# Patient Record
Sex: MALE | Race: WHITE
[De-identification: names, ages, dates, MRNs, and addresses within clinical notes are randomized per-mention and may not be internally consistent; named-entity substitution may affect disease eponyms.]

---

## 2017-11-14 ENCOUNTER — HOSPITAL ENCOUNTER (INPATIENT)
Dept: HOSPITAL 45 - C.EDC | Age: 67
LOS: 2 days | Discharge: HOME | DRG: 988 | End: 2017-11-16
Attending: HOSPITALIST | Admitting: HOSPITALIST
Payer: COMMERCIAL

## 2017-11-14 VITALS
BODY MASS INDEX: 30.49 KG/M2 | WEIGHT: 212.97 LBS | BODY MASS INDEX: 30.49 KG/M2 | HEIGHT: 70 IN | HEIGHT: 70 IN | WEIGHT: 212.97 LBS

## 2017-11-14 VITALS — OXYGEN SATURATION: 94 %

## 2017-11-14 DIAGNOSIS — C85.14: ICD-10-CM

## 2017-11-14 DIAGNOSIS — Z86.59: ICD-10-CM

## 2017-11-14 DIAGNOSIS — Z82.49: ICD-10-CM
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
## 2017-11-14 DIAGNOSIS — S22.43XA: Primary | ICD-10-CM
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
## 2017-11-14 LAB
ALP SERPL-CCNC: 87 U/L (ref 45–117)
ALT SERPL-CCNC: 23 U/L (ref 12–78)
ANION GAP SERPL CALC-SCNC: 7 MMOL/L (ref 3–11)
AST SERPL-CCNC: 16 U/L (ref 15–37)
BASOPHILS # BLD: 0.03 K/UL (ref 0–0.2)
BASOPHILS NFR BLD: 0.4 %
BUN SERPL-MCNC: 25 MG/DL (ref 7–18)
BUN/CREAT SERPL: 27.5 (ref 10–20)
CALCIUM SERPL-MCNC: 8.9 MG/DL (ref 8.5–10.1)
CHLORIDE SERPL-SCNC: 106 MMOL/L (ref 98–107)
CO2 SERPL-SCNC: 26 MMOL/L (ref 21–32)
COMPLETE: YES
CREAT CL PREDICTED SERPL C-G-VRATE: 94.3 ML/MIN
CREAT SERPL-MCNC: 0.91 MG/DL (ref 0.6–1.4)
EOSINOPHIL NFR BLD AUTO: 171 K/UL (ref 130–400)
GLUCOSE SERPL-MCNC: 94 MG/DL (ref 70–99)
HCT VFR BLD CALC: 41.9 % (ref 42–52)
IG%: 0.4 %
IMM GRANULOCYTES NFR BLD AUTO: 8.6 %
INR PPP: 1 (ref 0.9–1.1)
LYMPHOCYTES # BLD: 0.61 K/UL (ref 1.2–3.4)
MCH RBC QN AUTO: 30.6 PG (ref 25–34)
MCHC RBC AUTO-ENTMCNC: 34.8 G/DL (ref 32–36)
MCV RBC AUTO: 87.8 FL (ref 80–100)
MONOCYTES NFR BLD: 7 %
NEUTROPHILS # BLD AUTO: 0.6 %
NEUTROPHILS NFR BLD AUTO: 83 %
PARTIAL THROMBOPLASTIN RATIO: 1
PMV BLD AUTO: 8.8 FL (ref 7.4–10.4)
POTASSIUM SERPL-SCNC: 3.8 MMOL/L (ref 3.5–5.1)
PROTHROMBIN TIME: 11.1 SECONDS (ref 9–12)
RBC # BLD AUTO: 4.77 M/UL (ref 4.7–6.1)
SODIUM SERPL-SCNC: 139 MMOL/L (ref 136–145)
WBC # BLD AUTO: 7.13 K/UL (ref 4.8–10.8)

## 2017-11-14 RX ADMIN — Medication SCH EA: at 22:01

## 2017-11-14 RX ADMIN — ENOXAPARIN SODIUM SCH MG: 40 INJECTION SUBCUTANEOUS at 23:30

## 2017-11-14 RX ADMIN — TRAMADOL HYDROCHLORIDE PRN MG: 50 TABLET, COATED ORAL at 22:19

## 2017-11-14 NOTE — HISTORY & PHYSICAL EXAMINATION
DATE OF ADMISSION:  11/14/2017

 

The patient has no primary care doctor.

 

History was obtained from the patient and records.

 

CHIEF COMPLAINT:  Left back pain.

 

HISTORY OF PRESENT ILLNESS:  



Medical history is significant for mood disorder, currently not on medications.
  



Patient is a [place] resident.  

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  



Four days ago, the patient was pulled by his dog at the park, leading

him to fall on his left back.

Px noted worsening L posterior pleuritic back pain reminiscent of rib fracture

pain from about 7 years ago.  No shortness of breath, no hemoptysis, no

unusual weight loss.  No abdominal pain.  No hematuria.  



Intractable pain in the Emergency Room.

 

MEDICAL HISTORY:  As above.

 

SURGERIES:  Hernia surgery.

 

HOME MEDICATIONS:  Ibuprofen, as needed. 

 

ALLERGIES:  No known drug allergies.

 

FAMILY HISTORY:  Heart disease.

 

PERSONAL AND SOCIAL HISTORY:  Nonsmoker.  No chronic intake of alcoholic

beverages.  He was in the hotel management field prior to correction.

 

REVIEW OF SYSTEMS:  As per HPI, all 10 systems reviewed, all other ROS negative.

 

PHYSICAL EXAMINATION:

VITAL SIGNS:  Blood pressure was noted to be 131/90, pulse rate 69, RR 19,

temperature 36.7 and sats 94 on room air.

GENERAL:  Noted to be pleasant, in no respiratory distress.

SKIN:  Normal color.  Warm.

HEENT:  Pink palpebral conjunctivae.  No ptosis.  Dry buccal mucosa.

NECK:  Supple.  No tenderness.

CHEST:  Clear to auscultation.   posterior left chest wall tenderness.

HEART:  Regular rate and rhythm.  No murmur.

ABDOMEN:  Soft and nontender.

EXTREMITIES:  No edema.  No gross deformities.  No tenderness

NEUROLOGIC:  Coherent.  No gross focality.

 

LABORATORIES:  Hemoglobin was noted to be 14.6, hematocrit 41.9, white cell

count 10 and platelets of 171.  Sodium 142, potassium 3.8  chloride

106, CO2 26, BUN 25, creatinine 0.9 and glucose 194.  LFTs normal. 



CT of chest showed bilateral acute rib fractures, atelectasis and extensive

lymphadenopathy involving the mediastinal and hilar with additional sites of 
involvement 

including below the diaphragm highly concerning for metastatic disease or 
lymphoma,

Radiolucent lesion, left humerus

humeral head.

 

ASSESSMENT:

1.  Intractable pain from L posterior rib fracture secondary to fall.

2.  Incidental finding of a generalized lymphadenopathy

Possible lymphoma  

3.  History mood disorder, stable off meds

As per patient he was on meds years ago during a divorce.

  

PLAN:  

GMF

analgesia. 

PT OT eval

outpatient Hematology-Oncology referral for lymphadenopathy

DVT prophylaxis, Lovenox subQ.  

Full code.

 

 

 

MTDD

## 2017-11-14 NOTE — EMERGENCY ROOM VISIT NOTE
History


Report prepared by Say:  Mandi Paez


Under the Supervision of:  Dr. Don Sanchez M.D.


First contact with patient:  17:16


Stated Complaint:  BACK & RIB PAIN





History of Present Illness


The patient is a 67 year old male who presents to the Emergency Room with 

complaints of worsening left sided back and rib pain beginning 4 days ago. The 

patient notes that 4 days ago he was pulled over by his dog and fell on his 

back. Seven years ago, the patient was diagnosed with cracked ribs and states 

his pain is identical to his pain then. The patient's pain worsens with 

walking. This morning, the patient coughed and his back pain worsened. He has 

been taking Ibuprofen over the past four days and last took it 9 hours ago. The 

patient laid down this afternoon and has not been able to sit up since. The 

patient denies any abdominal pain and is not on any blood thinners.





   Source of History:  patient


   Onset:  4 days ago


   Position:  other (rib)


   Timing:  worsening


   Modifying Factors (Worsening):  movement


   Associated Symptoms:  + back pain, No abdominal pain





Review of Systems


See HPI for pertinent positives & negatives. A total of 10 systems reviewed and 

were otherwise negative.





Past Medical & Surgical


Medical Problems:


(1) No Known Active Medical Problems








Family History


No pertinent family history stated.





Social History


Alcohol Use:  occasionally


Marital Status:  





Current/Historical Medications


Scheduled PRN


Ibuprofen (Motrin), 400 MG PO TID PRN for Headache or Pain





Allergies


Coded Allergies:  


     No Known Allergies (Unverified , 11/14/17)





Physical Exam


Vital Signs











  Date Time  Temp Pulse Resp B/P (MAP) Pulse Ox O2 Delivery O2 Flow Rate FiO2


 


11/14/17 19:42    139/75    


 


11/14/17 18:43  69 19  90   


 


11/14/17 18:42    131/90    


 


11/14/17 18:30    142/79    


 


11/14/17 18:28  77 20  93   


 


11/14/17 18:13  75 23     


 


11/14/17 18:09  70      


 


11/14/17 18:00    134/79    


 


11/14/17 17:46     94 Room Air  


 


11/14/17 17:46     94 Room Air  


 


11/14/17 17:20 36.7 84 20 157/93 94 Room Air  











Physical Exam


GENERAL: Patient is a healthy-appearing well-nourished male


HEAD: Normocephalic atraumatic


EYES: Ocular movements intact pupils equal and react to light


OROPHARYNX mucous membranes are moist no exudates present no erythema or edema 

present


NECK: Supple no nuchal rigidity


CHEST: Exquisitely tender to left 10 and 11 rib area. The patient is unable to 

rollover.


LUNGS: Clear and equal to auscultation


CARDIAC: Normal S1 and S2


ABDOMEN: Soft nontender no guarding


BACK: No CVA tenderness


EXTREMITIES: No pain upon palpation normal muscle strength in all groups no 

clubbing cyanosis or edema


NEURO: Patient is following commands and answering questions appropriately. 

Alert and oriented x3 Cranial Nerves 2-12 grossly intact





Medical Decision & Procedures


ER Provider


Diagnostic Interpretation:


Radiology results as stated below per my review and radiologist interpretation:





(CHEST) THORAX WITH





FINDINGS:





 topogram: Unremarkable.





On soft tissue windows, normal thyroid and thoracic inlet. Asymmetric prominence


of left axillary lymph nodes, which measure up to 8 mm in the short axis.


Enlarged mediastinal lymph nodes with conglomerate lymphadenopathy in the


pretracheal and subcarinal regions. Conglomerate bilateral hilar


lymphadenopathy. Prominent right cardiophrenic lymph node. Atherosclerosis of


the aorta. Minimal aortic valve and coronary artery calcification. Normal heart


size. Trace left pleural effusion. No pericardial effusion. Congenital


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 2 well-defined


hypodensities in the liver indeterminate but likely hepatic cysts. Multiple


enlarged mesenteric lymph nodes noted. Borderline enlarged retroperitoneal


lymphadenopathy.





On lung windows, bilateral extensive bandlike opacities at the lung bases with


associated lower lobe volume loss, likely atelectasis. No other focal


infiltrate. Airways patent.





On bone windows, degenerative changes of the spine. Lucent lesion in the left


humeral head. Suggestion of nondisplaced anterior right rib fractures involving


the right third through sixth ribs. Suggestion of multiple old healed posterior


right rib fractures. Acute fracture of the left posterior eighth through 10th


ribs.





IMPRESSION:


1.  Bilateral acute rib fractures.





2.  Extensive bibasilar opacities with volume loss likely atelectasis.





3.  Extensive lymphadenopathy prominently involving the mediastinum and shauna


with additional sites of involvement as above, including below the diaphragm.


This is highly concerning for metastatic disease or lymphoma.





4.  Indeterminate lucent lesion in the left humeral head. Metastatic lesion not


excluded.











Electronically signed by:  Rober Campbell M.D.





THORACIC SPINE WITHOUT





FINDINGS:





 topogram: Unremarkable.





Normal thoracic kyphosis. Vertebral bodies maintain normal height and alignment.


Intervertebral disc spaces preserved. No acute fracture or subluxation. Acute


rib fractures better appreciated on dedicated chest CT. Multilevel degenerative


changes. Osseous neural foraminal narrowing suggested in the mid to lower


thoracic spine greater on the left. Paraspinal soft tissues remarkable for


partially visualized extensive lymphadenopathy better appreciated on CT chest.





IMPRESSION:


1.  No acute osseous injury of the thoracic spine.





2.  Please see separately dictated CT of the chest for description of acute


bilateral rib fractures and extensive lymphadenopathy.











Electronically signed by:  Rober Campbell M.D.





Laboratory Results


11/14/17 17:40








Red Blood Count 4.77, Mean Corpuscular Volume 87.8, Mean Corpuscular Hemoglobin 

30.6, Mean Corpuscular Hemoglobin Concent 34.8, Mean Platelet Volume 8.8, 

Neutrophils (%) (Auto) 83.0, Lymphocytes (%) (Auto) 8.6, Monocytes (%) (Auto) 

7.0, Eosinophils (%) (Auto) 0.6, Basophils (%) (Auto) 0.4, Neutrophils # (Auto) 

5.92, Lymphocytes # (Auto) 0.61, Monocytes # (Auto) 0.50, Eosinophils # (Auto) 

0.04, Basophils # (Auto) 0.03





11/14/17 17:40

















Test


  11/14/17


17:40


 


White Blood Count


  7.13 K/uL


(4.8-10.8)


 


Red Blood Count


  4.77 M/uL


(4.7-6.1)


 


Hemoglobin


  14.6 g/dL


(14.0-18.0)


 


Hematocrit 41.9 % (42-52) 


 


Mean Corpuscular Volume


  87.8 fL


()


 


Mean Corpuscular Hemoglobin


  30.6 pg


(25-34)


 


Mean Corpuscular Hemoglobin


Concent 34.8 g/dl


(32-36)


 


Platelet Count


  171 K/uL


(130-400)


 


Mean Platelet Volume


  8.8 fL


(7.4-10.4)


 


Neutrophils (%) (Auto) 83.0 % 


 


Lymphocytes (%) (Auto) 8.6 % 


 


Monocytes (%) (Auto) 7.0 % 


 


Eosinophils (%) (Auto) 0.6 % 


 


Basophils (%) (Auto) 0.4 % 


 


Neutrophils # (Auto)


  5.92 K/uL


(1.4-6.5)


 


Lymphocytes # (Auto)


  0.61 K/uL


(1.2-3.4)


 


Monocytes # (Auto)


  0.50 K/uL


(0.11-0.59)


 


Eosinophils # (Auto)


  0.04 K/uL


(0-0.5)


 


Basophils # (Auto)


  0.03 K/uL


(0-0.2)


 


RDW Standard Deviation


  42.2 fL


(36.4-46.3)


 


RDW Coefficient of Variation


  13.2 %


(11.5-14.5)


 


Immature Granulocyte % (Auto) 0.4 % 


 


Immature Granulocyte # (Auto)


  0.03 K/uL


(0.00-0.02)


 


Anion Gap


  7.0 mmol/L


(3-11)


 


Est Creatinine Clear Calc


Drug Dose 94.3 ml/min 


 


 


Estimated GFR (


American) 100.7 


 


 


Estimated GFR (Non-


American 86.9 


 


 


BUN/Creatinine Ratio 27.5 (10-20) 


 


Calcium Level


  8.9 mg/dl


(8.5-10.1)


 


Total Bilirubin


  0.5 mg/dl


(0.2-1)


 


Direct Bilirubin


  0.2 mg/dl


(0-0.2)


 


Aspartate Amino Transf


(AST/SGOT) 16 U/L (15-37) 


 


 


Alanine Aminotransferase


(ALT/SGPT) 23 U/L (12-78) 


 


 


Alkaline Phosphatase


  87 U/L


()


 


Total Creatine Kinase


  96 U/L


()


 


Total Protein


  6.8 gm/dl


(6.4-8.2)


 


Albumin


  3.7 gm/dl


(3.4-5.0)





Labs reviewed by ED physician.





Medications Administered











 Medications


  (Trade)  Dose


 Ordered  Sig/Matthias


 Route  Start Time


 Stop Time Status Last Admin


Dose Admin


 


 Hydromorphone HCl


  (Dilaudid Inj)  1 mg  NOW  STAT


 IV  11/14/17 17:24


 11/14/17 17:26 DC 11/14/17 17:24


1 MG


 


 Ketorolac


 Tromethamine


  (Toradol Inj)  30 mg  NOW  STAT


 IV  11/14/17 17:24


 11/14/17 17:26 DC 11/14/17 18:01


30 MG


 


 Lidocaine


  (Lidoderm Patch


 5%)  1 patch  NOW  STAT


 TD  11/14/17 17:24


 11/14/17 17:26 DC 11/14/17 17:24


1 PATCH


 


 Ondansetron HCl


  (Zofran Inj)  4 mg  NOW  STAT


 IV  11/14/17 17:24


 11/14/17 17:26 DC 11/14/17 18:01


4 MG











ED Course


1717: Past medical records reviewed. The patient was evaluated in room C10. A 

complete history and physical examination was performed. 





1724: Zofran Inj 4 mg IV, Lidocaine 1 patch TD, Toradol Inj 30 mg IV, Dilaudid 

Inj 1 mg IV. 





1930: I updated the patient on his test results. 





1937: I discussed the patient's case with Dr. Mercado, he has agreed 

to evaluate the patient for further management and care.





Medical Decision





Differential diagnosis:


Etiologies such as fracture, dislocation, intra-abdominal, pneumothorax, 

intrathoracic , intracranial, neurologic, as well as other traumatic 

pathologies were entertained.





This is a 67-year-old male who presents emergency department complaining of low 

back pain after a fall at home several days ago.  The patient is exquisitely 

tender to the left 11th and 10th rib area.  An IV was established, the patient 

was given Dilaudid.  His laboratory results are essentially normal.  The 

patient was sent for CAT scan of his chest as well as his thoracic spine based 

on where his pain was.  Serial abdominal examinations were performed on the 

patient in the emergency department and no tended to the patient exhibit a 

surgical abdomen or abdominal tenderness.  The patient was sent for a CAT scan 

which was concerning for multiple rib fractures along with a large amount of 

lymphadenopathy.  This patient does not have a primary care physician and based 

on this along with the fact and I'm having difficulty getting his pain under 

control I felt he should be admitted for a cancer workup.  I did discuss the 

case with the hospitalist service who agreed to admit the patient.  Patient was 

in agreement with the treatment plan.





Blood Pressure Screening


Patient's blood pressure:  Elevated blood pressure


Blood pressure disposition:  Referred to PCP (evaluated by hospitalist)





Consults


Time Called:  1930


Consulting Physician:  Dr. Mercado


Returned Call:  1937


I discussed the patient's case with Dr. Mercado, he has agreed to 

evaluate the patient for further management and care.





Impression





 Primary Impression:  


 Fall


 Additional Impressions:  


 Rib fractures


 Lymphadenopathy





Scribe Attestation


The scribe's documentation has been prepared under my direction and personally 

reviewed by me in its entirety. I confirm that the note above accurately 

reflects all work, treatment, procedures, and medical decision making performed 

by me.





Departure Information


Dispostion


Being Evaluated By Hospitalist





Referrals


No Doctor, Assigned (PCP)





Problem Qualifiers








 Primary Impression:  


 Fall


 Encounter type:  initial encounter  Qualified Codes:  W19.XXXA - Unspecified 

fall, initial encounter


 Additional Impressions:  


 Rib fractures


 Encounter type:  initial encounter  Rib fracture type:  multiple ribs  

Fracture type:  closed  Laterality:  left  Qualified Codes:  S22.42XA - 

Multiple fractures of ribs, left side, initial encounter for closed fracture

## 2017-11-14 NOTE — DIAGNOSTIC IMAGING REPORT
(CHEST) THORAX WITH



CLINICAL HISTORY: 67 years-old Male presenting with Pt c/o fall, left sided rib

pain . 



TECHNIQUE: Multidetector CT imaging of the chest was performed after the

administration of intravenous contrast. IV contrast: None. A dose lowering

technique was used consistent with the principles of ALARA (as low as reasonably

achievable).



COMPARISON: None.



CT DOSE (mGy.cm): The estimated cumulative dose is 465.32.



FINDINGS:



 topogram: Unremarkable.



On soft tissue windows, normal thyroid and thoracic inlet. Asymmetric prominence

of left axillary lymph nodes, which measure up to 8 mm in the short axis.

Enlarged mediastinal lymph nodes with conglomerate lymphadenopathy in the

pretracheal and subcarinal regions. Conglomerate bilateral hilar

lymphadenopathy. Prominent right cardiophrenic lymph node. Atherosclerosis of

the aorta. Minimal aortic valve and coronary artery calcification. Normal heart

size. Trace left pleural effusion. No pericardial effusion. Congenital

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 2 well-defined

hypodensities in the liver indeterminate but likely hepatic cysts. Multiple

enlarged mesenteric lymph nodes noted. Borderline enlarged retroperitoneal

lymphadenopathy.



On lung windows, bilateral extensive bandlike opacities at the lung bases with

associated lower lobe volume loss, likely atelectasis. No other focal

infiltrate. Airways patent.



On bone windows, degenerative changes of the spine. Lucent lesion in the left

humeral head. Suggestion of nondisplaced anterior right rib fractures involving

the right third through sixth ribs. Suggestion of multiple old healed posterior

right rib fractures. Acute fracture of the left posterior eighth through 10th

ribs.



IMPRESSION:

1.  Bilateral acute rib fractures.



2.  Extensive bibasilar opacities with volume loss likely atelectasis.



3.  Extensive lymphadenopathy prominently involving the mediastinum and shauna

with additional sites of involvement as above, including below the diaphragm.

This is highly concerning for metastatic disease or lymphoma.



4.  Indeterminate lucent lesion in the left humeral head. Metastatic lesion not

excluded.







Electronically signed by:  Rober Campbell M.D.

11/14/2017 7:22 PM



Dictated Date/Time:  11/14/2017 7:15 PM

## 2017-11-14 NOTE — DIAGNOSTIC IMAGING REPORT
THORACIC SPINE WITHOUT



CLINICAL HISTORY: 67 years-old Male presenting with Pt c/o left sided back pain

. 



TECHNIQUE: Multidetector CT of the thoracic spine was performed without the use

of intravenous contrast. IV contrast: None. A dose lowering technique was used

consistent with the principles of ALARA (as low as reasonably achievable).



COMPARISON: None.



CT DOSE (mGy.cm): The estimated cumulative dose is 465.32 mGy.cm.



FINDINGS:



 topogram: Unremarkable.



Normal thoracic kyphosis. Vertebral bodies maintain normal height and alignment.

Intervertebral disc spaces preserved. No acute fracture or subluxation. Acute

rib fractures better appreciated on dedicated chest CT. Multilevel degenerative

changes. Osseous neural foraminal narrowing suggested in the mid to lower

thoracic spine greater on the left. Paraspinal soft tissues remarkable for

partially visualized extensive lymphadenopathy better appreciated on CT chest.



IMPRESSION:

1.  No acute osseous injury of the thoracic spine.



2.  Please see separately dictated CT of the chest for description of acute

bilateral rib fractures and extensive lymphadenopathy.







Electronically signed by:  Rober Campbell M.D.

11/14/2017 7:24 PM



Dictated Date/Time:  11/14/2017 7:22 PM

## 2017-11-15 VITALS
SYSTOLIC BLOOD PRESSURE: 135 MMHG | OXYGEN SATURATION: 93 % | DIASTOLIC BLOOD PRESSURE: 87 MMHG | TEMPERATURE: 98.06 F | HEART RATE: 76 BPM

## 2017-11-15 VITALS
TEMPERATURE: 98.6 F | DIASTOLIC BLOOD PRESSURE: 82 MMHG | OXYGEN SATURATION: 94 % | HEART RATE: 70 BPM | SYSTOLIC BLOOD PRESSURE: 133 MMHG

## 2017-11-15 VITALS
SYSTOLIC BLOOD PRESSURE: 151 MMHG | DIASTOLIC BLOOD PRESSURE: 89 MMHG | TEMPERATURE: 98.06 F | OXYGEN SATURATION: 95 % | HEART RATE: 69 BPM

## 2017-11-15 VITALS
DIASTOLIC BLOOD PRESSURE: 84 MMHG | SYSTOLIC BLOOD PRESSURE: 160 MMHG | OXYGEN SATURATION: 95 % | TEMPERATURE: 96.98 F | HEART RATE: 69 BPM

## 2017-11-15 VITALS
TEMPERATURE: 98.6 F | SYSTOLIC BLOOD PRESSURE: 129 MMHG | OXYGEN SATURATION: 93 % | DIASTOLIC BLOOD PRESSURE: 77 MMHG | HEART RATE: 73 BPM

## 2017-11-15 VITALS
DIASTOLIC BLOOD PRESSURE: 90 MMHG | SYSTOLIC BLOOD PRESSURE: 148 MMHG | TEMPERATURE: 97.52 F | HEART RATE: 66 BPM | OXYGEN SATURATION: 95 %

## 2017-11-15 VITALS
OXYGEN SATURATION: 94 % | SYSTOLIC BLOOD PRESSURE: 126 MMHG | HEART RATE: 75 BPM | TEMPERATURE: 97.16 F | DIASTOLIC BLOOD PRESSURE: 76 MMHG

## 2017-11-15 LAB
BASOPHILS # BLD: 0.02 K/UL (ref 0–0.2)
BASOPHILS NFR BLD: 0.4 %
COMPLETE: YES
EOSINOPHIL NFR BLD AUTO: 159 K/UL (ref 130–400)
HCT VFR BLD CALC: 40.9 % (ref 42–52)
IG%: 0.7 %
IMM GRANULOCYTES NFR BLD AUTO: 11.5 %
LYMPHOCYTES # BLD: 0.64 K/UL (ref 1.2–3.4)
MCH RBC QN AUTO: 30.4 PG (ref 25–34)
MCHC RBC AUTO-ENTMCNC: 34 G/DL (ref 32–36)
MCV RBC AUTO: 89.5 FL (ref 80–100)
MONOCYTES NFR BLD: 8.6 %
NEUTROPHILS # BLD AUTO: 0.9 %
NEUTROPHILS NFR BLD AUTO: 77.9 %
NRBC BLD AUTO-RTO: 0 %
PMV BLD AUTO: 8.7 FL (ref 7.4–10.4)
RBC # BLD AUTO: 4.57 M/UL (ref 4.7–6.1)
WBC # BLD AUTO: 5.58 K/UL (ref 4.8–10.8)

## 2017-11-15 PROCEDURE — 07963ZX DRAINAGE OF LEFT AXILLARY LYMPHATIC, PERCUTANEOUS APPROACH, DIAGNOSTIC: ICD-10-PCS

## 2017-11-15 RX ADMIN — KETOROLAC TROMETHAMINE PRN MG: 15 INJECTION INTRAMUSCULAR; INTRAVENOUS at 07:50

## 2017-11-15 RX ADMIN — KETOROLAC TROMETHAMINE PRN MG: 15 INJECTION INTRAMUSCULAR; INTRAVENOUS at 14:22

## 2017-11-15 RX ADMIN — Medication SCH EA: at 21:24

## 2017-11-15 RX ADMIN — LIDOCAINE SCH PATCH: 50 PATCH CUTANEOUS at 07:51

## 2017-11-15 RX ADMIN — ENOXAPARIN SODIUM SCH MG: 40 INJECTION SUBCUTANEOUS at 21:26

## 2017-11-15 NOTE — PROGRESS NOTE
Internal Med Progress Note


Date of Service:


Nov 15, 2017.


Provider Documentation:





SUBJECTIVE:





feels fine 


had minimum pain with deep breath 


able to get out of bed , going to bathroom '


willing to walk on hallway 


no fever or chills ,no chest discomfort 


no abdominal pain or nausea 








OBJECTIVE:





Vital Signs-as noted below





Exam:


General-very pleasant , well appearing gentleman , no sign of distress, 

comfortable ,conversing appropriately 


Eyes-sclera non icteric , PERRLA/EOMI 


ENT-normal exam ,  , normal oropharynx, hearing grossly normal 


Neck-trachea midline, no thyromegaly ,no mass , no cervical lymphadenopathy


Lungs-clear to auscultate, no wheeze or rales, point tenderness on left lower 

rib cage , no bruise or ecchymosis  


Heart-regular S1/S2 , no JVD ,no carotid bruit, no lower ext edema 


Abdomen-soft, non tender, no organomegaly  , bowel sound active 


Extremities-no rash , deformity or cyanosis 


Neuro-AAO x3, no focal deficit 





Lab data as noted below.


ASSESSMENT & PLAN:


FALL /MULTIPLE RIB FRACTURE 


had mechanical fall while walking the dog -last Saturday 


no dizzy spell or lightheadedness prior to fall 


CT chest shows bilateral multiple rib Fx -Rt sided  3rd to 6th 


Left sided 8th to 10 


pain control 


incentive spirometry 


pt mentions of pleuritic chest pain has improved since admission 





MEDIASTINAL LYMPHADENOPATHY : 


incidental finding ; 


-CT scan of the chest done on 11/14/2017--> prominent left axilla lymph node 

measuring up to 8 mm, enlarged the mediastinal lymph nodes involving the 

pretracheal and subcarinal region, bilateral hilar lymphadenopathy noted. Trace 

left pleural effusion, possible hepatic cyst.  Multiple enlarged mesenteric 

lymph nodes noted, borderline enlarged retroperitoneal lymphadenopathy noted. 

Nondisplaced the anterior right rib fracture involving the right 3rd through 

6th ribs.  Acute fracture noted involving the left posterior 8th through 10th 

ribs.  Small lucent lesion left middle head noted.





-CT scan of the thoracic spine done on 11/4/2017 showed no acute the bony 

injury to the thoracic spine noted.





pt denies of any symptoms of wt loss, lack of appetite , tiredness or night 

sweat 





appreciate input form Heme OnC Dr Nichols 





pt underwent USg guided biopsy of LN of left axially region 


no pain pain or discomfort at the biopsy site 





pt will need out pt follow up with Heme Onc for pathology result and treatment 

option if biopsy is positive for Lymphoma 








DVT PROPHYLAXIS: 


low risk -very active at baseline 


SCD and teds 


ambulate 





DISPOSITION


lives at home 


very active 


independent in ADL's 


will need Heme onc follow up as out pt 


does not have an established PCP 


willing to establish care at [facility]


Vital Signs:











  Date Time  Temp Pulse Resp B/P (MAP) Pulse Ox O2 Delivery O2 Flow Rate FiO2


 


11/16/17 08:10 36.5 69 18 134/79 (97) 91 Room Air  


 


11/16/17 08:00      Room Air  


 


11/16/17 04:09 36.3 73 20 159/90 (113) 95 Room Air  


 


11/16/17 00:00      Room Air  


 


11/15/17 23:51 36.7 69 18 151/89 (109) 95 Room Air  


 


11/15/17 18:58 36.1 69 18 160/84 (109) 95 Room Air  


 


11/15/17 16:00      Room Air  


 


11/15/17 15:07      Room Air  


 


11/15/17 15:07 37.0 70 20 133/82 (99) 94   


 


11/15/17 11:50 36.4 66 22 148/90 (109) 95   








Lab Results:

## 2017-11-15 NOTE — MEDICAL CONSULT
Consultation


Date of Consultation:


Nov 15, 2017.


Attending Physician:


Marlene Bautista M.D.


History of Present Illness


Hematology/Oncology consult:  Evaluation management of mediastinal and hilar 

lymphadenopathy.





Date of consultation:  11/15/2017





HPI:  67-year-old male, who had an accidental fall in the park (he was pulled 

by his dog) last week on 11/11/2017, he complained of some left-sided chest pain

, he took ibuprofen with some pain relief but then he had some slight cough and 

then he had sudden increasing left-sided chest pain, he came to the [facility] ER for further evaluation, found to have rib fracture 

involving the left 8th through 10th ribs.





  He had CT scan of the chest which also showed evidence of mediastinal and 

hilar lymphadenopathy.





Hematology consulted for further evaluation of the lymphadenopathy noted in the 

recent imaging study. up an active





He says that before that he was doing quite well, had no fever, no night sweats

, no other systemic symptoms, no weight loss, he was not on any medication at 

home.  He was quite up and active.





REVIEW OF SYSTEMS:


GENERAL:  No change in weight, no weakness, no fatigue, no fever, no chills, 

some sweats present because of pain.


SKIN:  No skin rash, no bruising.


HEAD:  No new headache, no dizziness.


EYES:  No recent change in the vision, no diplopia, 


EARS: No earache  no tinnitus,


NOSE: No epistaxis, No nasal discharge or stuffiness, 


MOUTH:  No sores, no dysphagia, no hoarseness of voice, 


NECK:  No lumps, No swelling in thyroid area.  No stiffness.


PULMONARY: No cough, No shortness of breath, no hemoptysis, left-sided chest 

pain following recent fall with some rib fracture, No wheezing.


CARDIOVASCULAR: No anginal chest pain, no PND, no orthopnea.  No palpitation, 

no leg edema. No syncope.


GASTROINTESTINAL: No abdominal pain, no nausea or vomiting. No diarrhea, No 

constipation. No blood in stool or black tarry stools. No abdominal distention. 


UROLOGIC: No burning urination.  No hematuria. 


MUSCULOSKELETAL:  No joint pain, No joint swelling, no muscle weakness.


HEMATOLOGIC: No anemia, no bleeding disorder, No bruising.  


NEUROLOGIC: No seizures, no focal weakness, no speech difficulty, No memory 

disturbances. No tingling or numbness of the extremities.


PSYCHIATRIC: No depression. No anxiety. No psychosis.


SLEEP:  No sleep disorder.





Past medical and surgical history:  He had a hernia surgery in the past, 

otherwise no other significant medical problem, no taking any medication at 

home.





Social history:  Nonsmoker, denies any ETOH abuse.  Recently he was in [place] 

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





Family history:  Not significant





Medications:  Please review is chart for detailed list of medications.


Recently he took ibuprofen for symptom treatment of left-sided chest pain 

following a fall.





Allergies:  None





On exam:


- Alert and oriented x3, well built man, not in any distress.


- HEENT: no icterus, no pallor, Throat: Normal.


- Neck: No palpable cervical lymphadenopathy.


- Chest: clear to auscultation.


- Abdomen: soft, nontender, no hepatomegaly, no splenomegaly.


- No focal neuro deficit.


- Extremities: no finger clubbing, no leg edema.


- No obvious palpable lymphadenopathy in the axilla but I could not palpate 

left axilla because of the pain.





Lab:


Blood workup done on 11/14/2017:


-WBC 7100, H&H of 14.6/41.9, Platelet count of 171,000.  ANC 5900, absolute 

lymphocyte count 610.


-BUN/Creat:  25/0.9, normal liver function test.


-LDH--> 166.


-hepatitis C serology--> negative


-Normal PT and PTT.





Imaging:


-CT scan of the chest done on 11/14/2017--> prominent left axilla lymph node 

measuring up to 8 mm, enlarged the mediastinal lymph nodes involving the 

pretracheal and subcarinal region, bilateral hilar lymphadenopathy noted. Trace 

left pleural effusion, possible hepatic cyst.  Multiple enlarged mesenteric 

lymph nodes noted, borderline enlarged retroperitoneal lymphadenopathy noted. 

Nondisplaced the anterior right rib fracture involving the right 3rd through 

6th ribs.  Acute fracture noted involving the left posterior 8th through 10th 

ribs.  Small lucent lesion left middle head noted.





-CT scan of the thoracic spine done on 11/4/2017 showed no acute the bony 

injury to the thoracic spine noted.








ASSESSMENT AND PLAN:  67-year-old male, who had an accidental fall with injury 

to the back and the left-sided chest, had increasing left-sided chest pain 

following slight cough, imaging study showed evidence of fracture involving the 

left 8 through 10th rib, also showed evidence of mediastinal hilar 

lymphadenopathy, small 8 mm left axillary lymph node also noted. He has no B 

symptoms, otherwise he is doing quite well. Normal LDH level.  Normal blood 

counts noted. Normal kidney and liver function test noted. He does not any 

specific symptoms related to the underlying enlarged lymph nodes in the chest 

or in the axilla.





I think we need a tissue diagnosis from the mediastinal lymph nodes or hilar 

lymph nodes for further evaluation.  Ultrasound-guided FNA from the left axilla 

lymph node can be considered but may not be adequate if it is lymphoma.





Will decide about further workup and treatment options after having definitive 

diagnosis in his case.





Thanks for the consultation.





Dr. Riccardo Nichols


Hem/Onc


(This note was completed using the dictation program Fluency Direct. As such, 

there may be misspellings, word substitutions, or other variations that should 

not change the essence of the clinical content of this encounter note. If there 

is need for further clarification, please direct questions to the provider 

listed above.)





Past Medical/Surgical History


Medical Problems:


(1) Fall


Status: Acute  





(2) Lymphadenopathy


Status: Acute  





(3) Rib fractures


Status: Acute  











Social History


Smoking Status:  Former Smoker


Marital Status:  





Allergies


Coded Allergies:  


     No Known Allergies (Unverified , 11/14/17)





Current Inpatient Medications





Current Inpatient Medications








 Medications


  (Trade)  Dose


 Ordered  Sig/Matthias


 Route  Start Time


 Stop Time Status Last Admin


Dose Admin


 


 Ioversol


  (Optiray 320)  120 ml  UD  PRN


 IV  11/14/17 19:15


 11/18/17 19:14   


 


 


 Lidocaine


  (Lidoderm Patch


 5%)  1 patch  QAM


 TD  11/15/17 08:00


 12/15/17 08:59  11/15/17 07:51


1 PATCH


 


 Miscellaneous


  (Remove Lidoderm


 Patch)  1 ea  DAILY@21


 N/A  11/14/17 21:00


 12/14/17 20:59  11/14/17 22:01


1 EA


 


 Ketorolac


 Tromethamine


  (Toradol Inj)  15 mg  Q6H  PRN


 IV.  11/14/17 20:30


 11/19/17 20:29  11/15/17 07:50


15 MG


 


 Tramadol HCl


  (Ultram Tab)  not


 relieved


 by tylenol @   Q6H  PRN


 PO  11/14/17 20:30


 12/14/17 20:29  11/14/17 22:19


50 MG


 


 Ondansetron HCl


  (Zofran Inj)  4 mg  Q6H  PRN


 IV  11/14/17 20:30


 12/14/17 20:29   


 


 


 Morphine Sulfate


  (MoRPHine


 SULFATE INJ)  4 mg  Q4H  PRN


 IV  11/14/17 20:30


 11/28/17 20:29  11/15/17 03:45


4 MG


 


 Potassium


 Chloride/Sodium


 Chloride  1,000 ml @ 


 75 mls/hr  [id number] ONCE


 IV  11/14/17 21:30


 11/15/17 10:49  11/14/17 22:00


75 MLS/HR


 


 Enoxaparin Sodium


  (Lovenox Inj)  40 mg  Q24H


 SQ  11/14/17 22:00


 12/14/17 21:59  11/14/17 23:30


40 MG


 


 Acetaminophen


  (Tylenol Tab)  650 mg  Q4H  PRN


 PO  11/14/17 20:30


 12/14/17 20:29   


 











Physical Exam











  Date Time  Temp Pulse Resp B/P (MAP) Pulse Ox O2 Delivery O2 Flow Rate FiO2


 


11/15/17 08:21      Room Air  


 


11/15/17 07:54 36.7 76 20 135/87 (103) 93   


 


11/15/17 04:05 36.2 75 20 126/76 (93) 94 Room Air  


 


11/15/17 00:31 37.0 73 18 129/77 (94) 93 Room Air  


 


11/15/17 00:00      Room Air  


 


11/14/17 22:07     94   


 


11/14/17 21:31  72 18 126/76 94   


 


11/14/17 20:58  72 18 125/76 94 Room Air  


 


11/14/17 19:42    139/75    


 


11/14/17 18:43  69 19  90   


 


11/14/17 18:42    131/90    


 


11/14/17 18:30    142/79    


 


11/14/17 18:28  77 20  93   


 


11/14/17 18:13  75 23     


 


11/14/17 18:09  70      


 


11/14/17 18:00    134/79    


 


11/14/17 17:46     94 Room Air  


 


11/14/17 17:46     94 Room Air  


 


11/14/17 17:20 36.7 84 20 157/93 94 Room Air  











Laboratory Results





Last 24 Hours








Test


  11/14/17


17:40 11/14/17


17:43 11/15/17


06:48 11/15/17


06:49


 


White Blood Count 7.13 K/uL    5.58 K/uL 


 


Red Blood Count 4.77 M/uL    4.57 M/uL 


 


Hemoglobin 14.6 g/dL    13.9 g/dL 


 


Hematocrit 41.9 %    40.9 % 


 


Mean Corpuscular Volume 87.8 fL    89.5 fL 


 


Mean Corpuscular Hemoglobin 30.6 pg    30.4 pg 


 


Mean Corpuscular Hemoglobin


Concent 34.8 g/dl 


  


  


  34.0 g/dl 


 


 


Platelet Count 171 K/uL    159 K/uL 


 


Mean Platelet Volume 8.8 fL    8.7 fL 


 


Neutrophils (%) (Auto) 83.0 %    77.9 % 


 


Lymphocytes (%) (Auto) 8.6 %    11.5 % 


 


Monocytes (%) (Auto) 7.0 %    8.6 % 


 


Eosinophils (%) (Auto) 0.6 %    0.9 % 


 


Basophils (%) (Auto) 0.4 %    0.4 % 


 


Neutrophils # (Auto) 5.92 K/uL    4.35 K/uL 


 


Lymphocytes # (Auto) 0.61 K/uL    0.64 K/uL 


 


Monocytes # (Auto) 0.50 K/uL    0.48 K/uL 


 


Eosinophils # (Auto) 0.04 K/uL    0.05 K/uL 


 


Basophils # (Auto) 0.03 K/uL    0.02 K/uL 


 


RDW Standard Deviation 42.2 fL    44.0 fL 


 


RDW Coefficient of Variation 13.2 %    13.4 % 


 


Immature Granulocyte % (Auto) 0.4 %    0.7 % 


 


Immature Granulocyte # (Auto) 0.03 K/uL    0.04 K/uL 


 


Prothrombin Time 11.1 SECONDS    


 


Prothromb Time International


Ratio 1.0 


  


  


  


 


 


Activated Partial


Thromboplast Time 26.7 SECONDS 


  


  


  


 


 


Partial Thromboplastin Ratio 1.0    


 


Sodium Level 139 mmol/L    


 


Potassium Level 3.8 mmol/L    


 


Chloride Level 106 mmol/L    


 


Carbon Dioxide Level 26 mmol/L    


 


Anion Gap 7.0 mmol/L    


 


Blood Urea Nitrogen 25 mg/dl    


 


Creatinine 0.91 mg/dl    


 


Est Creatinine Clear Calc


Drug Dose 94.3 ml/min 


  


  


  


 


 


Estimated GFR (


American) 100.7 


  


  


  


 


 


Estimated GFR (Non-


American 86.9 


  


  


  


 


 


BUN/Creatinine Ratio 27.5    


 


Random Glucose 94 mg/dl    


 


Calcium Level 8.9 mg/dl    


 


Total Bilirubin 0.5 mg/dl    


 


Direct Bilirubin 0.2 mg/dl    


 


Aspartate Amino Transf


(AST/SGOT) 16 U/L 


  


  


  


 


 


Alanine Aminotransferase


(ALT/SGPT) 23 U/L 


  


  


  


 


 


Alkaline Phosphatase 87 U/L    


 


Total Creatine Kinase 96 U/L    


 


Total Protein 6.8 gm/dl    


 


Albumin 3.7 gm/dl    


 


Hepatitis C Antibody Screen NEG    


 


Bedside Glucose  87 mg/dl   


 


Lactate Dehydrogenase   166 U/L

## 2017-11-15 NOTE — PROGRESS NOTE
Progress Note


Date of Service


Nov 15, 2017.





Progress Note


ATTENDING NOTE: 





 67 years-old Male presenting with Pt c/o fall, left sided rib


pain . 








CT CHEST -INCIDENTAL FINDING : 


-Asymmetric prominence of left axillary lymph nodes, which measure up to 8 mm 

in the short axis.


 - Extensive lymphadenopathy prominently involving the mediastinum and shauna


with additional sites of involvement as above, including below the diaphragm.


This is highly concerning for metastatic disease or lymphoma.


- Indeterminate lucent lesion in the left humeral head. Metastatic lesion not


excluded.





HEME ONC Consult requested 





will need tissue biopsy 


D/w Radiology ordered for USG guided biopsy of the Left axillary LN

## 2017-11-15 NOTE — DIAGNOSTIC IMAGING REPORT
ULTRASOUND-GUIDED FINE-NEEDLE ASPIRATION BIOPSY OF A LEFT AXILLARY LYMPH NODE



CLINICAL HISTORY: Borderline enlarged left axillary lymph nodes    



COMPARISON STUDY:  Chest CT dated 11/14/2017



FINDINGS: The risks of the procedure were explained the patient and informed

consent was obtained.



The patient was prepped in sterile fashion.



Under ultrasound guidance, utilizing a 25-gauge needle, a sample for the

patient's left axillary lymph nodes was obtained. Additional pathologic review

revealed no lymphoid material. A second pass was performed and sent for flow

cytometry. A third pass was performed and sent for both flow cytometry and

cytologic diagnosis. Initial pathologic review confirms the presence of lymphoid

material. Final pathology is pending at this time.



IMPRESSION:  Successful ultrasound-guided fine-needle aspiration biopsy of a

borderline enlarged left axillary lymph node. 









Electronically signed by:  Harsha Blandon M.D.

11/15/2017 2:16 PM



Dictated Date/Time:  11/15/2017 2:13 PM

## 2017-11-16 VITALS
HEART RATE: 74 BPM | DIASTOLIC BLOOD PRESSURE: 82 MMHG | OXYGEN SATURATION: 93 % | TEMPERATURE: 97.16 F | SYSTOLIC BLOOD PRESSURE: 136 MMHG

## 2017-11-16 VITALS
TEMPERATURE: 97.7 F | OXYGEN SATURATION: 91 % | SYSTOLIC BLOOD PRESSURE: 134 MMHG | HEART RATE: 69 BPM | DIASTOLIC BLOOD PRESSURE: 79 MMHG

## 2017-11-16 VITALS
DIASTOLIC BLOOD PRESSURE: 90 MMHG | SYSTOLIC BLOOD PRESSURE: 159 MMHG | HEART RATE: 73 BPM | TEMPERATURE: 97.34 F | OXYGEN SATURATION: 95 %

## 2017-11-16 VITALS
HEART RATE: 74 BPM | DIASTOLIC BLOOD PRESSURE: 82 MMHG | SYSTOLIC BLOOD PRESSURE: 136 MMHG | TEMPERATURE: 97.16 F | OXYGEN SATURATION: 93 %

## 2017-11-16 RX ADMIN — LIDOCAINE SCH PATCH: 50 PATCH CUTANEOUS at 08:42

## 2017-11-16 RX ADMIN — TRAMADOL HYDROCHLORIDE PRN MG: 50 TABLET, COATED ORAL at 08:43

## 2017-11-16 RX ADMIN — TRAMADOL HYDROCHLORIDE PRN MG: 50 TABLET, COATED ORAL at 00:02

## 2017-11-16 RX ADMIN — TRAMADOL HYDROCHLORIDE PRN MG: 50 TABLET, COATED ORAL at 14:21

## 2017-11-16 NOTE — DISCHARGE SUMMARY
Discharge Summary


Date of Service


Nov 16, 2017.





Discharge Summary


Admission Date:


Nov 14, 2017 at 20:22


Discharge Date:  Nov 16, 2017


Discharge Disposition:  Home


Principal Diagnosis:


RIB FRACTURE /MEDIASTINAL LYMPH NODE ENLARGEMENT


Procedures:


USG GUIDED AXILLARY LYMPH NODE BIOPSY 





CT CHEST with CONTRAST : 


IMPRESSION:


1.  Bilateral acute rib fractures.





2.  Extensive bibasilar opacities with volume loss likely atelectasis.





3.  Extensive lymphadenopathy prominently involving the mediastinum and shauna


with additional sites of involvement as above, including below the diaphragm.


This is highly concerning for metastatic disease or lymphoma.





4.  Indeterminate lucent lesion in the left humeral head. Metastatic lesion not


excluded.





CT CERVICAL SPINE : 


IMPRESSION:


1.  No acute osseous injury of the thoracic spine.





2.  Please see separately dictated CT of the chest for description of acute


bilateral rib fractures and extensive lymphadenopathy.


Consultations:


HEME ONC ; DR NICHOLS





Medication Reconciliation


New Medications:  


Tramadol HCl (Tramadol HCl) 50 Mg Tab


50 MG PO Q6H PRN for Pain, #30 TAB





 


Continued Medications:  


Ibuprofen (Motrin) 400 Mg Tab


400 MG PO TID PRN for Headache or Pain, TAB


PRN








Referrals At Discharge


Follow up Referrals:  


Oncology/Hematology Referral - 12/04/17 with Riccardo Nichols M.D.


Physician Referral - 11/22/17 with Abbi MINER M.D.








Admission Information


HPI (per Admitting provider):


DATE OF ADMISSION:  11/14/2017


 


The patient has no primary care doctor.


 


History was obtained from the patient and records.


 


CHIEF COMPLAINT:  Left back pain.


 


HISTORY OF PRESENT ILLNESS:  





Medical history is significant for mood disorder, currently not on medications.

  





Patient is a [place] resident.  


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  





Four days ago, the patient was pulled by his dog at the park, leading


him to fall on his left back.


Px noted worsening L posterior pleuritic back pain reminiscent of rib fracture


pain from about 7 years ago.  No shortness of breath, no hemoptysis, no


unusual weight loss.  No abdominal pain.  No hematuria.  





Intractable pain in the Emergency Room.


 


MEDICAL HISTORY:  As above.


 


SURGERIES:  Hernia surgery.


 


HOME MEDICATIONS:  Ibuprofen, as needed. 


 


ALLERGIES:  No known drug allergies.


 


FAMILY HISTORY:  Heart disease.


 


PERSONAL AND SOCIAL HISTORY:  Nonsmoker.  No chronic intake of alcoholic


beverages.  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


Physical Exam (per Admitting):


REVIEW OF SYSTEMS:  As per HPI, all 10 systems reviewed, all other ROS negative.


 


PHYSICAL EXAMINATION:


VITAL SIGNS:  Blood pressure was noted to be 131/90, pulse rate 69, RR 19,


temperature 36.7 and sats 94 on room air.


GENERAL:  Noted to be pleasant, in no respiratory distress.


SKIN:  Normal color.  Warm.


HEENT:  Pink palpebral conjunctivae.  No ptosis.  Dry buccal mucosa.


NECK:  Supple.  No tenderness.


CHEST:  Clear to auscultation.   posterior left chest wall tenderness.


HEART:  Regular rate and rhythm.  No murmur.


ABDOMEN:  Soft and nontender.


EXTREMITIES:  No edema.  No gross deformities.  No tenderness


NEUROLOGIC:  Coherent.  No gross focality.





Hospital Course


FALL /MULTIPLE RIB FRACTURE 


had mechanical fall while walking the dog -last Saturday 


no dizzy spell or lightheadedness prior to fall 


CT chest shows bilateral multiple rib Fx -Rt sided  3rd to 6th 


Left sided 8th to 10 


pain control 


incentive spirometry 


pt mentions of pleuritic chest pain has improved since admission 





MEDIASTINAL LYMPHADENOPATHY : 


incidental finding ; 


-CT scan of the chest done on 11/14/2017--> prominent left axilla lymph node 

measuring up to 8 mm, enlarged the mediastinal lymph nodes involving the 

pretracheal and subcarinal region, bilateral hilar lymphadenopathy noted. Trace 

left pleural effusion, possible hepatic cyst.  Multiple enlarged mesenteric 

lymph nodes noted, borderline enlarged retroperitoneal lymphadenopathy noted. 

Nondisplaced the anterior right rib fracture involving the right 3rd through 

6th ribs.  Acute fracture noted involving the left posterior 8th through 10th 

ribs.  Small lucent lesion left middle head noted.





-CT scan of the thoracic spine done on 11/4/2017 showed no acute the bony 

injury to the thoracic spine noted.





pt denies of any symptoms of wt loss, lack of appetite , tiredness or night 

sweat 





appreciate input form Heme OnC Dr Nichols 





pt underwent USg guided biopsy of LN of left axially region 


no pain pain or discomfort at the biopsy site 





pt will need out pt follow up with Heme Onc for pathology result and treatment 

option if biopsy is positive for Lymphoma 








DVT PROPHYLAXIS: 


low risk -very active at baseline 


SCD and teds 


ambulate 





DISPOSITION


lives at home 


very active 


independent in ADL's 


will need Heme onc follow up as out pt 


does not have an established PCP 


willing to establish care at [facility]


Total time spent on discharge = 40 MINS 


This includes examination of the patient, discharge planning, medication 

reconciliation, and communication with other providers.





Discharge Instructions


Discharge Instructions


Date of Service


Nov 16, 2017.





Admission


Reason for Admission:  Rib Fractures





Discharge


Discharge Diagnosis / Problem:  RIB FRACTURE /MEDIASTINAL LYMPH NODE ENLARGEMENT





Discharge Goals


Goal(s):  Decrease discomfort, Improve function, Increase independence, Improve 

disease control, Diagnostic testing, Therapeutic intervention





Activity Recommendations


Activity Limitations:  resume your previous activity





.





Instructions / Follow-Up


Instructions / Follow-Up


HOSPITAL FOLLOW UP : 11/22/2017 1:00 PM Abbi Miner MD General Internal 

Medicine [facility] 





HEME ONC FOLLOW UP :12/4/2017 12:45 PM Riccardo Nichols MD Hematology/Oncology 

[facility] Diet


Patient's current hospital diet: Regular Diet





Discharge Diet


Recommended Diet:  Regular Diet





Pending Studies


Studies pending at discharge:  no





Medical Emergencies








.


Who to Call and When:





Medical Emergencies:  If at any time you feel your situation is an emergency, 

please call 911 immediately.





.





Non-Emergent Contact


Non-Emergency issues call your:  Primary Care Provider





.


.








"Provider Documentation" section prepared by Marlene Bautista.








.





VTE Core Measure


Inpt VTE Proph given/why not?:  T.E.D. Stockings, SCD's





Additional Copies To


Riccardo Nichols M.D. RAJ, Anitha ., M.D.

## 2017-11-16 NOTE — DISCHARGE INSTRUCTIONS
Discharge Instructions


Date of Service


Nov 16, 2017.





Admission


Reason for Admission:  Rib Fractures





Discharge


Discharge Diagnosis / Problem:  RIB FRACTURE /MEDIASTINAL LYMPH NODE ENLARGEMENT





Discharge Goals


Goal(s):  Decrease discomfort, Improve function, Increase independence, Improve 

disease control, Diagnostic testing, Therapeutic intervention





Activity Recommendations


Activity Limitations:  resume your previous activity





.





Instructions / Follow-Up


Instructions / Follow-Up


HOSPITAL FOLLOW UP : 11/22/2017 1:00 PM Abbi Miner MD General Internal 

Medicine [facility] 





HEME ONC FOLLOW UP :12/4/2017 12:45 PM Riccardo Nichols MD Hematology/Oncology 

[facility] Diet


Patient's current hospital diet: Regular Diet





Discharge Diet


Recommended Diet:  Regular Diet





Pending Studies


Studies pending at discharge:  no





Medical Emergencies








.


Who to Call and When:





Medical Emergencies:  If at any time you feel your situation is an emergency, 

please call 911 immediately.





.





Non-Emergent Contact


Non-Emergency issues call your:  Primary Care Provider





.


.








"Provider Documentation" section prepared by Marlene Bautista.








.





VTE Core Measure


Inpt VTE Proph given/why not?:  T.E.D. Stockings, SCD's

## 2017-11-19 NOTE — EDITING REQUIRED CODING QUERY
PATHOLOGY

                                                  

To promote full compliance with coding requirements relating to patient care, physician 
participation is requested in all cases of  uncertainty. Please assist us with the 
question(s) below:



Please review the Pathology report and please document any relevant diagnosis(es) below:





Diagnosis(es):Non Hodgkin's B cell Lymphoma 



Thank you  

Valeria Marino

## 2017-12-31 ENCOUNTER — HOSPITAL ENCOUNTER (EMERGENCY)
Dept: HOSPITAL 45 - C.EDB | Age: 67
Discharge: HOME | End: 2017-12-31
Payer: COMMERCIAL

## 2017-12-31 VITALS
OXYGEN SATURATION: 96 % | SYSTOLIC BLOOD PRESSURE: 145 MMHG | HEART RATE: 91 BPM | DIASTOLIC BLOOD PRESSURE: 97 MMHG | TEMPERATURE: 97.52 F

## 2017-12-31 VITALS
WEIGHT: 214.51 LBS | HEIGHT: 70 IN | HEIGHT: 70 IN | WEIGHT: 214.51 LBS | BODY MASS INDEX: 30.71 KG/M2 | BODY MASS INDEX: 30.71 KG/M2

## 2017-12-31 DIAGNOSIS — L76.32: Primary | ICD-10-CM

## 2017-12-31 DIAGNOSIS — Z85.72: ICD-10-CM

## 2017-12-31 LAB
BASOPHILS # BLD: 0.03 K/UL (ref 0–0.2)
BASOPHILS NFR BLD: 0.5 %
BUN SERPL-MCNC: 21 MG/DL (ref 7–18)
CALCIUM SERPL-MCNC: 9.3 MG/DL (ref 8.5–10.1)
CO2 SERPL-SCNC: 28 MMOL/L (ref 21–32)
CREAT SERPL-MCNC: 0.88 MG/DL (ref 0.6–1.4)
EOS ABS #: 0.05 K/UL (ref 0–0.5)
EOSINOPHIL NFR BLD AUTO: 199 K/UL (ref 130–400)
GLUCOSE SERPL-MCNC: 72 MG/DL (ref 70–99)
HCT VFR BLD CALC: 43.4 % (ref 42–52)
HGB BLD-MCNC: 15.5 G/DL (ref 14–18)
IG#: 0.03 K/UL (ref 0–0.02)
IMM GRANULOCYTES NFR BLD AUTO: 10.9 %
INR PPP: 1 (ref 0.9–1.1)
LYMPHOCYTES # BLD: 0.7 K/UL (ref 1.2–3.4)
MCH RBC QN AUTO: 31.6 PG (ref 25–34)
MCHC RBC AUTO-ENTMCNC: 35.7 G/DL (ref 32–36)
MCV RBC AUTO: 88.6 FL (ref 80–100)
MONO ABS #: 0.59 K/UL (ref 0.11–0.59)
MONOCYTES NFR BLD: 9.2 %
NEUT ABS #: 5 K/UL (ref 1.4–6.5)
NEUTROPHILS # BLD AUTO: 0.8 %
NEUTROPHILS NFR BLD AUTO: 78.1 %
PMV BLD AUTO: 8.6 FL (ref 7.4–10.4)
POTASSIUM SERPL-SCNC: 4.1 MMOL/L (ref 3.5–5.1)
PTT PATIENT: 25.7 SECONDS (ref 21–31)
RED CELL DISTRIBUTION WIDTH CV: 13.3 % (ref 11.5–14.5)
RED CELL DISTRIBUTION WIDTH SD: 43.6 FL (ref 36.4–46.3)
SODIUM SERPL-SCNC: 138 MMOL/L (ref 136–145)
WBC # BLD AUTO: 6.4 K/UL (ref 4.8–10.8)

## 2017-12-31 NOTE — DIAGNOSTIC IMAGING REPORT
CHEST ONE VIEW PORTABLE



CLINICAL HISTORY: EVALUATE ALTERED MENTAL STATUS/WEAKNESS    



COMPARISON STUDY:  CT chest 11/14/2017



FINDINGS: Chronic bibasilar atelectatic change. Mid and upper lungs are clear.

No evidence pneumothorax. Heart top limits normal terms of size. 



IMPRESSION:  Chronic bibasilar atelectasis. Otherwise negative study. 











The above report was generated using voice recognition software.  It may contain

grammatical, syntax or spelling errors.









Electronically signed by:  Cr Chandler M.D.

12/31/2017 12:43 PM



Dictated Date/Time:  12/31/2017 12:41 PM

## 2017-12-31 NOTE — DIAGNOSTIC IMAGING REPORT
SOFT TISSUE NECK WITH



CLINICAL HISTORY: poss hematoma with airway compromise--surgery 4 days ago r

neck    



TECHNIQUE: Transaxial acquisition with multi axial reformatted images



COMPARISON STUDY:  None



FINDINGS: Moderate bilateral cervical adenopathy. Subcutaneous air is noted

lateral to the right platysmas muscle transaxial image 218. This is immediately

anterior to a cervical node.



Deep to this is a small partially air-containing collection measuring 1.5 cm at

maximum. Maximum cephalocaudal dimension is approximately 3.0 cm. This may

represent a small hematoma with postoperative air. I cannot entirely exclude the

possibility of abscess although with that recent surgical procedure this is

perhaps less likely unless clinically indicated otherwise.



Major salivary glands are unremarkable. The parotid and submandibular glands are

unremarkable.



Findings of moderate cervical adenopathy primarily in the mid to lower cervical

chains. This is seen along the posterior aspect of the upper cervical chains

deep to the sternocleidomastoid musculature. Maximum linear dimension of a right

posterior cervical node is 2 cm.



Additional nodes throughout the cervical and submandibular regions are noted

bilaterally with supraclavicular and retroclavicular nodes measuring up to 2.8

cm. The thyroid appears symmetric. There is no significant subglottic edema.

There is closure of the vocal cords most likely secondary to patient phonating

during the study. There is mild edematous change of the hypopharynx. There is

moderate narrowing of the airway at that site.



IMPRESSION:  

1. Complex air and fluid containing somewhat linear structure deep to the right

mid soft tissue neck most likely deep to a surgical incision site.

2. This measures 3.0 x 3.0 x 1.5 cm and contains complex fluid and air.

3. Although given the short time frame from the patient's surgical procedure

this statistically is postoperative/hematoma related, if additional information

on a clinical basis indicates the possibility of abscess this is not excluded.

4. Significant bilateral cervical adenopathy. 









The above report was generated using voice recognition software.  It may contain

grammatical, syntax or spelling errors.







Electronically signed by:  Cr Chandler M.D.

12/31/2017 2:21 PM



Dictated Date/Time:  12/31/2017 2:14 PM

## 2017-12-31 NOTE — EMERGENCY ROOM VISIT NOTE
History


Report prepared by Say:  Chelita Lowry


Under the Supervision of:  Dr. Jose Jose M.D.


First contact with patient:  12:16


Chief Complaint:  SWELLING TO EXTREMITY


Stated Complaint:  WOUND RECHECK





History of Present Illness


The patient is a 67 year old male who presents to the Emergency Room with 

complaints of worsening swelling to his neck starting three days ago. The 

patient states that he had a lymph node removed from his neck by Dr. Hollis at 

[facility] four days ago. He states that since then his neck has been very 

swollen. He reports that he has been icing it like they told him, but states 

that by the middle of the night or morning, it is so swollen he has difficulty 

swallowing. He reports that it feels like he "swallowed a cantaloupe." He 

complains of also having some labored breathing from the Lymphoma. The patient 

complains of an irritation from his neck that gives him a headache and fatigue 

from not being able to sleep. He states that this has not gotten better since 

the surgery. The patient notes that he has been taking Tylenol for pain since 

he still has neck discomfort and denied taking narcotics that Dr. Hollis wanted 

to prescribe. He notes it has not offered much relief. The patient denies fever 

and chills.





   Source of History:  patient


   Onset:  3 days ago


   Position:  neck


   Quality:  other (swollen)


   Timing:  worsening


   Modifying Factors (Relieving):  ice


   Associated Symptoms:  + headache, + SOB, + fatigue, No fevers, No chills





Review of Systems


See HPI for pertinent positives & negatives. A total of 10 systems reviewed and 

were otherwise negative.





Past Medical & Surgical


Medical Problems:


(1) Hx of fracture of rib


(2) Lymphoma








Family History





No pertinent family history





Social History


Smoking Status:  Never Smoker


Alcohol Use:  occasionally


Marital Status:  


Housing Status:  lives with family


Occupation Status:  retired





Current/Historical Medications


Scheduled PRN


Ibuprofen (Motrin), 400 MG PO TID PRN for Headache or Pain


Tramadol HCl (Tramadol HCl), 50 MG PO Q6H PRN for Pain





Allergies


Coded Allergies:  


     No Known Allergies (Unverified , 11/14/17)





Physical Exam


Vital Signs











  Date Time  Temp Pulse Resp B/P (MAP) Pulse Ox O2 Delivery O2 Flow Rate FiO2


 


12/31/17 15:26 36.4 91 18 145/97 96   


 


12/31/17 12:10 36.4 91 18 161/107 96 Room Air  











Physical Exam


GENERAL: Patient is in no acute distress.


HEENT: No acute trauma, normocephalic atraumatic, mucous membranes moist, no 

nasal congestion, no scleral icterus.


NECK: No stridor. Has a bandage to the right neck consistent with noted 

surgery. No excessive bleeding. A little bit of surrounding contusion. No 

erythema. The area is swollen.


LUNGS: Decreased breath sounds on the right side. Lungs are clear. No wheezing 

or rhonchi.


HEART: Without murmurs gallops or rubs, regular rate and rhythm.


ABDOMEN: Soft, nontender, bowel sounds positive, no hernias, no peritonitis.


EXTREMITIES: No cyanosis or edema, full range of motion of all the joints 

without pain or difficulty, no signs for acute trauma.


NEUROLOGIC: Oriented x 3, no acute motor or sensory deficits, no focal weakness.


SKIN: No rash, no jaundice, no diaphoresis.





Medical Decision & Procedures


ER Provider


Diagnostic Interpretation:


Radiology results as stated below per my review and radiologist interpretation:





CHEST ONE VIEW PORTABLE





CLINICAL HISTORY: EVALUATE ALTERED MENTAL STATUS/WEAKNESS    





COMPARISON STUDY:  CT chest 11/14/2017





FINDINGS: Chronic bibasilar atelectatic change. Mid and upper lungs are clear.


No evidence pneumothorax. Heart top limits normal terms of size. 





IMPRESSION:  Chronic bibasilar atelectasis. Otherwise negative study. 

















The above report was generated using voice recognition software.  It may contain


grammatical, syntax or spelling errors.














Electronically signed by:  Cr Chandler M.D.


12/31/2017 12:43 PM





Dictated Date/Time:  12/31/2017 12:41 PM








SOFT TISSUE NECK WITH





CLINICAL HISTORY: poss hematoma with airway compromise--surgery 4 days ago r


neck    





TECHNIQUE: Transaxial acquisition with multi axial reformatted images





COMPARISON STUDY:  None





FINDINGS: Moderate bilateral cervical adenopathy. Subcutaneous air is noted


lateral to the right platysmas muscle transaxial image 218. This is immediately


anterior to a cervical node.





Deep to this is a small partially air-containing collection measuring 1.5 cm at


maximum. Maximum cephalocaudal dimension is approximately 3.0 cm. This may


represent a small hematoma with postoperative air. I cannot entirely exclude the


possibility of abscess although with that recent surgical procedure this is


perhaps less likely unless clinically indicated otherwise.





Major salivary glands are unremarkable. The parotid and submandibular glands are


unremarkable.





Findings of moderate cervical adenopathy primarily in the mid to lower cervical


chains. This is seen along the posterior aspect of the upper cervical chains


deep to the sternocleidomastoid musculature. Maximum linear dimension of a right


posterior cervical node is 2 cm.





Additional nodes throughout the cervical and submandibular regions are noted


bilaterally with supraclavicular and retroclavicular nodes measuring up to 2.8


cm. The thyroid appears symmetric. There is no significant subglottic edema.


There is closure of the vocal cords most likely secondary to patient phonating


during the study. There is mild edematous change of the hypopharynx. There is


moderate narrowing of the airway at that site.





IMPRESSION:  


1. Complex air and fluid containing somewhat linear structure deep to the right


mid soft tissue neck most likely deep to a surgical incision site.


2. This measures 3.0 x 3.0 x 1.5 cm and contains complex fluid and air.


3. Although given the short time frame from the patient's surgical procedure


this statistically is postoperative/hematoma related, if additional information


on a clinical basis indicates the possibility of abscess this is not excluded.


4. Significant bilateral cervical adenopathy. 














The above report was generated using voice recognition software.  It may contain


grammatical, syntax or spelling errors.











Electronically signed by:  Cr Chandler M.D.


12/31/2017 2:21 PM





Dictated Date/Time:  12/31/2017 2:14 PM





Laboratory Results


12/31/17 13:00








Red Blood Count 4.90, Mean Corpuscular Volume 88.6, Mean Corpuscular Hemoglobin 

31.6, Mean Corpuscular Hemoglobin Concent 35.7, Mean Platelet Volume 8.6, 

Neutrophils (%) (Auto) 78.1, Lymphocytes (%) (Auto) 10.9, Monocytes (%) (Auto) 

9.2, Eosinophils (%) (Auto) 0.8, Basophils (%) (Auto) 0.5, Neutrophils # (Auto) 

5.00, Lymphocytes # (Auto) 0.70, Monocytes # (Auto) 0.59, Eosinophils # (Auto) 

0.05, Basophils # (Auto) 0.03





12/31/17 13:00

















Test


  12/31/17


13:00


 


White Blood Count


  6.40 K/uL


(4.8-10.8)


 


Red Blood Count


  4.90 M/uL


(4.7-6.1)


 


Hemoglobin


  15.5 g/dL


(14.0-18.0)


 


Hematocrit 43.4 % (42-52) 


 


Mean Corpuscular Volume


  88.6 fL


()


 


Mean Corpuscular Hemoglobin


  31.6 pg


(25-34)


 


Mean Corpuscular Hemoglobin


Concent 35.7 g/dl


(32-36)


 


Platelet Count


  199 K/uL


(130-400)


 


Mean Platelet Volume


  8.6 fL


(7.4-10.4)


 


Neutrophils (%) (Auto) 78.1 % 


 


Lymphocytes (%) (Auto) 10.9 % 


 


Monocytes (%) (Auto) 9.2 % 


 


Eosinophils (%) (Auto) 0.8 % 


 


Basophils (%) (Auto) 0.5 % 


 


Neutrophils # (Auto)


  5.00 K/uL


(1.4-6.5)


 


Lymphocytes # (Auto)


  0.70 K/uL


(1.2-3.4)


 


Monocytes # (Auto)


  0.59 K/uL


(0.11-0.59)


 


Eosinophils # (Auto)


  0.05 K/uL


(0-0.5)


 


Basophils # (Auto)


  0.03 K/uL


(0-0.2)


 


RDW Standard Deviation


  43.6 fL


(36.4-46.3)


 


RDW Coefficient of Variation


  13.3 %


(11.5-14.5)


 


Immature Granulocyte % (Auto) 0.5 % 


 


Immature Granulocyte # (Auto)


  0.03 K/uL


(0.00-0.02)


 


Prothrombin Time


  10.8 SECONDS


(9.0-12.0)


 


Prothromb Time International


Ratio 1.0 (0.9-1.1) 


 


 


Activated Partial


Thromboplast Time 25.7 SECONDS


(21.0-31.0)


 


Partial Thromboplastin Ratio 1.0 


 


Anion Gap


  5.0 mmol/L


(3-11)


 


Est Creatinine Clear Calc


Drug Dose 95.3 ml/min 


 


 


Estimated GFR (


American) 103.0 


 


 


Estimated GFR (Non-


American 88.9 


 


 


BUN/Creatinine Ratio 24.1 (10-20) 


 


Calcium Level


  9.3 mg/dl


(8.5-10.1)





Laboratory results reviewed by me.





Medications Administered











 Medications


  (Trade)  Dose


 Ordered  Sig/Matthias


 Route  Start Time


 Stop Time Status Last Admin


Dose Admin


 


 Oxycodone HCl


  (Roxicodone


 Immediate Rel 5MG


 Home Pack)  1 homepack  UD  ONCE


 PO  12/31/17 15:15


 12/31/17 15:16 DC 12/31/17 15:26


1 HOMEPACK











ED Course


1217: The patient was evaluated in room C2. A complete history and physical 

exam was performed.





1443: Reevaluated the patient. Discussed results and discharge instructions: He 

verbalized understanding and agreement. The patient is ready for discharge.





1515: Ordered Oxycodone HCl 1 homepack PO.





Medical Decision


Differential diagnoses include hematoma, airway or esophageal compromise, 

carotid compromise, infection, anemia, coagulopathy. 





There is no leukocytosis or concerning anemia.  No significant electrolyte 

abnormality or kidney failure.  No coagulopathy.  Chest x-ray does not show 

pneumonia, pneumothorax or mediastinal widening.  Soft tissue neck CT shows a 

hematoma in the area where the cervical node was removed.  No evidence for 

significant adjacent structure compromise.





The patient was reassured by his testing.  He is being discharged with ice to 

the area as directed by his surgeon.  He will use Tylenol for pain.  He will 

return for fever, worsening swelling or difficulty breathing/swallowing.





Medication Reconcilliation


Current Medication List:  was personally reviewed by me





Blood Pressure Screening


Patient's blood pressure:  Elevated blood pressure


Blood pressure disposition:  Referred to PCP





Impression





 Primary Impression:  


 Hematoma of neck





Scribe Attestation


The scribe's documentation has been prepared under my direction and personally 

reviewed by me in its entirety. I confirm that the note above accurately 

reflects all work, treatment, procedures, and medical decision making performed 

by me.





Departure Information


Dispostion


Home / Self-Care





Referrals


No Doctor, Assigned (PCP)





Forms


HOME CARE DOCUMENTATION FORM,                                                 

               IMPORTANT VISIT INFORMATION, WORK / SCHOOL INSTRUCTIONS





Patient Instructions


My [facility]





Additional Instructions





continue with the ice as before


use 1 gram of tylenol ( 2 extra strength tabs ) every 6 hours for pain


use oxy ir 1 tab every 4 hours as needed for severe pain


return for fever or trouble breathing

## 2018-01-12 ENCOUNTER — HOSPITAL ENCOUNTER (OUTPATIENT)
Dept: HOSPITAL 45 - C.ACU | Age: 68
Discharge: HOME | End: 2018-01-12
Attending: SURGERY
Payer: SELF-PAY

## 2018-01-12 VITALS
DIASTOLIC BLOOD PRESSURE: 85 MMHG | HEART RATE: 65 BPM | TEMPERATURE: 97.88 F | SYSTOLIC BLOOD PRESSURE: 131 MMHG | OXYGEN SATURATION: 95 %

## 2018-01-12 VITALS
TEMPERATURE: 97.7 F | HEART RATE: 74 BPM | DIASTOLIC BLOOD PRESSURE: 85 MMHG | OXYGEN SATURATION: 94 % | SYSTOLIC BLOOD PRESSURE: 165 MMHG

## 2018-01-12 VITALS
HEIGHT: 70 IN | HEIGHT: 70 IN | BODY MASS INDEX: 30.13 KG/M2 | WEIGHT: 210.43 LBS | BODY MASS INDEX: 30.13 KG/M2 | BODY MASS INDEX: 30.13 KG/M2 | WEIGHT: 210.43 LBS

## 2018-01-12 VITALS — SYSTOLIC BLOOD PRESSURE: 122 MMHG | OXYGEN SATURATION: 94 % | HEART RATE: 68 BPM | DIASTOLIC BLOOD PRESSURE: 79 MMHG

## 2018-01-12 DIAGNOSIS — Z83.3: ICD-10-CM

## 2018-01-12 DIAGNOSIS — Z79.899: ICD-10-CM

## 2018-01-12 DIAGNOSIS — Z82.49: ICD-10-CM

## 2018-01-12 DIAGNOSIS — C82.18: Primary | ICD-10-CM

## 2018-01-12 DIAGNOSIS — M51.36: ICD-10-CM

## 2018-01-12 NOTE — ANESTHESIOLOGY PROGRESS NOTE
Anesthesia Post Op Note


Date & Time


Jan 12, 2018 at 11:59





Vital Signs


Pain Intensity:  6.0





Vital Signs Past 12 Hours








  Date Time  Temp Pulse Resp B/P (MAP) Pulse Ox O2 Delivery O2 Flow Rate FiO2


 


1/12/18 11:31 36.2 75 16 120/79 95 Room Air  


 


1/12/18 08:24 36.5 74 18 165/85 (111) 94 Room Air  











Notes


Mental Status:  alert / awake / arousable, participated in evaluation


Pt Amnestic to Procedure:  Yes


Nausea / Vomiting:  adequately controlled


Pain:  adequately controlled


Airway Patency, RR, SpO2:  stable & adequate


BP & HR:  stable & adequate


Hydration State:  stable & adequate


Anesthetic Complications:  no major complications apparent

## 2018-01-12 NOTE — OPERATIVE REPORT
DATE OF OPERATION:  01/12/2018

 

PREOPERATIVE DIAGNOSIS:  Lymphoma.

 

POSTOPERATIVE DIAGNOSIS:  Same.

 

PROCEDURE:  Port-A-Catheter insertion on the left internal jugular vein.

 

SURGEON:  Dr. Cheryl Shaikh.

 

FIRST ASSISTANT:  Mitali Ardon PA-C.

 

ANESTHESIA:  Conscious sedation plus local.

 

ESTIMATED BLOOD LOSS:  About 10 mL.

 

FINDINGS:  Patent left internal jugular vein.  

 

COMPLICATIONS:  None.

 

INDICATIONS FOR THE PROCEDURE:  This is a 67-year-old gentleman who had newly

diagnosed diagnosis lymphoma  need a Port-A-Catheter insertion.  I did talk

to the patient about the benefit and risk, alternate procedure.  I indicated

the risks may include but not limited such as bleeding, infection, injury

lung, dysfunction catheter, blood clot.  The patient understands.  He signed

informed consent and I answered all questions.

 

OPERATION AND FINDINGS: 

 

DETAILS OF PROCEDURE:  We brought the patient to the OR, put the patient in

Trendelenburg position.  The patient received SCD on bilateral legs to

prevent DVT.  Also, patient received 2 grams Ancef IV for prophylactic

antibiotic.  The patient received conscious sedation by the anesthesiology. 

The left side of the neck and upper chest was prepped and draped in routine

sterile fashion.  After time out, I injected the local anesthesia by using 1%

lidocaine mixed with 0.5% Marcaine on the left side of the neck and left side

upper chest, then I made a 0.5  incision on the left side of the neck, used

the ultrasound to locate the left internal jugular vein, used a 15-gauge

needle to puncture the left internal jugular vein without difficulty easily

blood returned and passed  the wire in and used fluoro guidance conform the

catheter go to superior vena cava.  Then I made about a 3 cm incision on the

left side upper chest to create the pouch for the port.  Hemostasis obtained

and then we passed the catheter through to the incision and put the sheath of

the catheter through the wire and removed the wire.  Then we passed the

catheter through the sheath.  Then we removed the sheath.  Then I used 2-0

Prolene affix the port 3 points on the chest wall.  Then we used fluoro to 

conform the catheter located superior vena cava.  Then I closed the incision

subcutaneous layer by using 2-0 Vicryl, closed the skin by using 4-0 Vicryl. 

Then we put the dressing on.  I then used the straight needle to puncture the

port easily blood returned and injection the normal saline  plus the heparin

10 mL.  The patient tolerated the procedure well.  All the instrument, needle

and sponge count correct x2 at the end of case.  The patient transferred to

recovery room in stable condition.

 

 

I attest to the content of the Intraoperative Record and any orders documented 
therein. Any exceptions are noted below.

 

 

 

MTDD

## 2018-01-12 NOTE — DIAGNOSTIC IMAGING REPORT
CHEST ONE VIEW PORTABLE



CLINICAL HISTORY: Chest x-ray status post central line placement LYMPHOMA



COMPARISON STUDY:  12/31/2017



FINDINGS: The cardiac and mediastinal contours remain stable. There is been

interval placement of a left internal jugular A-Port catheter. The tip projects

over the superior vena cava near the expected innominate vein confluence. There

is no pneumothorax. There is no focal pulmonary consolidation. There is minor

basilar atelectasis.[ 



IMPRESSION: No evidence of pneumothorax status post placement of a left-sided

internal jugular A-Port catheter







Electronically signed by:  Harsha Blandon M.D.

1/12/2018 11:53 AM



Dictated Date/Time:  1/12/2018 11:53 AM

## 2018-01-12 NOTE — [facility] POST OPERATIVE BRIEF NOTE
Immediate Operative Summary


Operative Date


Jan 12, 2018.





Pre-Operative Diagnosis





Lymphoma





Post-Operative Diagnosis





Lymphoma





Procedure(s) Performed





A-port Insertion, Left Intrrnal jugular Vein





Surgeon


Dr. Shaikh





Assistant Surgeon(s)


Roxann Ardon PA-C





Estimated Blood Loss


10 cc





Findings


patent left internal jugular vein





Fluids (cc crystalloids)


600ml





Specimens





none per surgeon





Drains


none





Anesthesia


sedation + local





Complication(s)


None





Disposition


Recovery Room / PACU

## 2018-01-30 ENCOUNTER — HOSPITAL ENCOUNTER (INPATIENT)
Dept: HOSPITAL 45 - C.EDB | Age: 68
LOS: 3 days | Discharge: HOME | DRG: 871 | End: 2018-02-02
Attending: HOSPITALIST | Admitting: HOSPITALIST
Payer: COMMERCIAL

## 2018-01-30 VITALS
HEIGHT: 70 IN | WEIGHT: 205.03 LBS | BODY MASS INDEX: 29.35 KG/M2 | BODY MASS INDEX: 29.35 KG/M2 | HEIGHT: 70 IN | WEIGHT: 205.03 LBS

## 2018-01-30 DIAGNOSIS — D69.59: ICD-10-CM
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
## 2018-01-30 DIAGNOSIS — J04.0: ICD-10-CM

## 2018-01-30 DIAGNOSIS — J40: ICD-10-CM

## 2018-01-30 DIAGNOSIS — C82.03: ICD-10-CM

## 2018-01-30 DIAGNOSIS — Z79.899: ICD-10-CM

## 2018-01-30 DIAGNOSIS — Z83.3: ICD-10-CM

## 2018-01-30 DIAGNOSIS — C82.01: ICD-10-CM

## 2018-01-30 DIAGNOSIS — C82.02: ICD-10-CM

## 2018-01-30 DIAGNOSIS — A41.9: Primary | ICD-10-CM

## 2018-01-30 DIAGNOSIS — T45.1X5A: ICD-10-CM

## 2018-01-30 DIAGNOSIS — J18.9: ICD-10-CM

## 2018-01-30 LAB
ALBUMIN SERPL-MCNC: 3.6 GM/DL (ref 3.4–5)
ALP SERPL-CCNC: 131 U/L (ref 45–117)
ALT SERPL-CCNC: 29 U/L (ref 12–78)
AST SERPL-CCNC: 17 U/L (ref 15–37)
BASOPHILS # BLD: 0.03 K/UL (ref 0–0.2)
BASOPHILS NFR BLD: 0.5 %
BUN SERPL-MCNC: 24 MG/DL (ref 7–18)
CALCIUM SERPL-MCNC: 8.7 MG/DL (ref 8.5–10.1)
CO2 SERPL-SCNC: 24 MMOL/L (ref 21–32)
CREAT SERPL-MCNC: 0.95 MG/DL (ref 0.6–1.4)
EOS ABS #: 0.06 K/UL (ref 0–0.5)
EOSINOPHIL NFR BLD AUTO: 126 K/UL (ref 130–400)
GLUCOSE SERPL-MCNC: 118 MG/DL (ref 70–99)
HCT VFR BLD CALC: 40.9 % (ref 42–52)
HGB BLD-MCNC: 14.5 G/DL (ref 14–18)
IG#: 0.08 K/UL (ref 0–0.02)
IMM GRANULOCYTES NFR BLD AUTO: 5.8 %
INR PPP: 1.1 (ref 0.9–1.1)
LIPASE: 70 U/L (ref 73–393)
LYMPHOCYTES # BLD: 0.34 K/UL (ref 1.2–3.4)
MCH RBC QN AUTO: 31 PG (ref 25–34)
MCHC RBC AUTO-ENTMCNC: 35.5 G/DL (ref 32–36)
MCV RBC AUTO: 87.6 FL (ref 80–100)
MONO ABS #: 0.48 K/UL (ref 0.11–0.59)
MONOCYTES NFR BLD: 8.2 %
NEUT ABS #: 4.83 K/UL (ref 1.4–6.5)
NEUTROPHILS # BLD AUTO: 1 %
NEUTROPHILS NFR BLD AUTO: 83.1 %
PMV BLD AUTO: 9.7 FL (ref 7.4–10.4)
POTASSIUM SERPL-SCNC: 3.6 MMOL/L (ref 3.5–5.1)
PROT SERPL-MCNC: 7 GM/DL (ref 6.4–8.2)
PTT PATIENT: 26.3 SECONDS (ref 21–31)
RED CELL DISTRIBUTION WIDTH CV: 13.2 % (ref 11.5–14.5)
RED CELL DISTRIBUTION WIDTH SD: 41.9 FL (ref 36.4–46.3)
SODIUM SERPL-SCNC: 135 MMOL/L (ref 136–145)
WBC # BLD AUTO: 5.82 K/UL (ref 4.8–10.8)

## 2018-01-31 VITALS
OXYGEN SATURATION: 97 % | TEMPERATURE: 98.06 F | SYSTOLIC BLOOD PRESSURE: 134 MMHG | HEART RATE: 76 BPM | DIASTOLIC BLOOD PRESSURE: 82 MMHG

## 2018-01-31 VITALS
HEART RATE: 104 BPM | DIASTOLIC BLOOD PRESSURE: 74 MMHG | TEMPERATURE: 97.7 F | OXYGEN SATURATION: 95 % | SYSTOLIC BLOOD PRESSURE: 123 MMHG

## 2018-01-31 VITALS
OXYGEN SATURATION: 94 % | HEART RATE: 99 BPM | SYSTOLIC BLOOD PRESSURE: 109 MMHG | DIASTOLIC BLOOD PRESSURE: 71 MMHG | TEMPERATURE: 98.06 F

## 2018-01-31 VITALS
HEART RATE: 82 BPM | OXYGEN SATURATION: 94 % | SYSTOLIC BLOOD PRESSURE: 124 MMHG | DIASTOLIC BLOOD PRESSURE: 81 MMHG | TEMPERATURE: 97.34 F

## 2018-01-31 VITALS
SYSTOLIC BLOOD PRESSURE: 118 MMHG | TEMPERATURE: 97.52 F | OXYGEN SATURATION: 95 % | HEART RATE: 76 BPM | DIASTOLIC BLOOD PRESSURE: 83 MMHG

## 2018-01-31 VITALS
HEART RATE: 90 BPM | TEMPERATURE: 97.52 F | OXYGEN SATURATION: 93 % | DIASTOLIC BLOOD PRESSURE: 74 MMHG | SYSTOLIC BLOOD PRESSURE: 124 MMHG

## 2018-01-31 VITALS
SYSTOLIC BLOOD PRESSURE: 135 MMHG | TEMPERATURE: 98.42 F | DIASTOLIC BLOOD PRESSURE: 84 MMHG | HEART RATE: 101 BPM | OXYGEN SATURATION: 94 %

## 2018-01-31 LAB
BASOPHILS # BLD: 0.01 K/UL (ref 0–0.2)
BASOPHILS NFR BLD: 0.2 %
BUN SERPL-MCNC: 18 MG/DL (ref 7–18)
CALCIUM SERPL-MCNC: 8.5 MG/DL (ref 8.5–10.1)
CO2 SERPL-SCNC: 23 MMOL/L (ref 21–32)
CREAT SERPL-MCNC: 0.66 MG/DL (ref 0.6–1.4)
EOS ABS #: 0.15 K/UL (ref 0–0.5)
EOSINOPHIL NFR BLD AUTO: 122 K/UL (ref 130–400)
FLUAV RNA SPEC QL NAA+PROBE: (no result)
FLUBV RNA SPEC QL NAA+PROBE: (no result)
GLUCOSE SERPL-MCNC: 96 MG/DL (ref 70–99)
HCT VFR BLD CALC: 37.9 % (ref 42–52)
HGB BLD-MCNC: 13.2 G/DL (ref 14–18)
IG#: 0.09 K/UL (ref 0–0.02)
IMM GRANULOCYTES NFR BLD AUTO: 8.6 %
INFLUENZA B ANTIGEN: (no result)
LYMPHOCYTES # BLD: 0.46 K/UL (ref 1.2–3.4)
MCH RBC QN AUTO: 30.8 PG (ref 25–34)
MCHC RBC AUTO-ENTMCNC: 34.8 G/DL (ref 32–36)
MCV RBC AUTO: 88.6 FL (ref 80–100)
MONO ABS #: 0.52 K/UL (ref 0.11–0.59)
MONOCYTES NFR BLD: 9.8 %
NEUT ABS #: 4.09 K/UL (ref 1.4–6.5)
NEUTROPHILS # BLD AUTO: 2.8 %
NEUTROPHILS NFR BLD AUTO: 76.9 %
PMV BLD AUTO: 9.5 FL (ref 7.4–10.4)
POTASSIUM SERPL-SCNC: 3.8 MMOL/L (ref 3.5–5.1)
RED CELL DISTRIBUTION WIDTH CV: 13.2 % (ref 11.5–14.5)
RED CELL DISTRIBUTION WIDTH SD: 42.5 FL (ref 36.4–46.3)
SODIUM SERPL-SCNC: 138 MMOL/L (ref 136–145)
WBC # BLD AUTO: 5.32 K/UL (ref 4.8–10.8)

## 2018-01-31 RX ADMIN — HEPARIN PRN ML: 100 SYRINGE at 12:50

## 2018-01-31 RX ADMIN — LEVOFLOXACIN SCH MG: 750 TABLET, FILM COATED ORAL at 12:23

## 2018-01-31 RX ADMIN — GUAIFENESIN SCH MG: 600 TABLET, EXTENDED RELEASE ORAL at 20:39

## 2018-01-31 RX ADMIN — Medication SCH MG: at 08:47

## 2018-01-31 RX ADMIN — HYDROCODONE BITARTRATE AND HOMATROPINE METHYLBROMIDE PRN ML: 5; 1.5 SOLUTION ORAL at 17:31

## 2018-01-31 RX ADMIN — ALLOPURINOL SCH MG: 300 TABLET ORAL at 08:47

## 2018-01-31 NOTE — EMERGENCY ROOM VISIT NOTE
History


Report prepared by Say:  Chelita Lowry


Under the Supervision of:  Dr. German Parra M.D.


First contact with patient:  22:50


Chief Complaint:  FEVER


Stated Complaint:  HIGH TEMP, ON CHEMO, BRONCHITIS





History of Present Illness


The patient is a 67 year old male who presents to the Emergency Room with 

complaints of a worsening fever starting this evening. The patient states that 

he has Lymphoma cancer and is on chemotherapy. He states that he was recently 

diagnosed with Bronchitis and was placed on Doxycycline. He states that his 

temperature was 100 before coming in. The patient complains of a cough, nausea, 

loss of appetite, voice hoarseness, inability to sleep, and body aches. He 

states that he is unsure if his voice hoarseness is from chemotherapy or being 

sick. He states that he has chest and abdominal pain when he coughs. He notes 

that he has not been able to sleep for 3 nights. The patient states that he 

took Tylenol this morning with little relief. Pt denies LOC, headache, chills, 

diaphoresis, visual changes, neck pain, breathing difficulties, vomiting,  back 

pain, melena, hematochezia, urinary symptoms, numbness, weakness, 

lymphadenopathy, rash, or other complaints.





   Source of History:  patient


   Onset:  this evening


   Position:  other (global)


   Quality:  ache


   Timing:  worsening


   Associated Symptoms:  + fevers, + cough, + chest pain, + nausea, + abdominal 

pain


Note:


The patient complains of loss of appetite, voice hoarseness, and inability to 

sleep.





Review of Systems


See HPI for pertinent positives and negatives.  A total of ten systems were 

reviewed and were otherwise negative.





Past Medical & Surgical


Medical Problems:


(1) Hx of fracture of rib


(2) Lymphoma


(3) Sepsis








Family History





No pertinent family history





Social History


Smoking Status:  Never Smoker


Alcohol Use:  occasionally


Marital Status:  


Housing Status:  lives with family


Occupation Status:  retired





Current/Historical Medications


Scheduled


Allopurinol (Zyloprim), 300 MG PO DAILY


Doxycycline Hyclate (Doxycycline Hyclate), 1 TAB PO BID


Loratadine (Claritin), 10 MG PO DAILY





Scheduled PRN


Acetaminophen (Tylenol), 650 MG PO QID PRN for Pain or Fever





Allergies


Coded Allergies:  


     No Known Allergies (Unverified , 1/30/18)





Physical Exam


Vital Signs











  Date Time  Temp Pulse Resp B/P (MAP) Pulse Ox O2 Delivery O2 Flow Rate FiO2


 


1/31/18 00:49 36.9 105 20 113/75 95   


 


1/30/18 23:47  109 20 118/76 94 Room Air  


 


1/30/18 23:33  115      


 


1/30/18 22:35 37.8 135 18 117/76 92 Room Air  











Physical Exam


GENERAL: Awake, alert, ill-appearing, in no distress


HENT: Normocephalic, atraumatic. Oropharynx unremarkable.


EYES: Normal conjunctiva. Sclera non-icteric.


NECK: Supple. No nuchal rigidity. FROM. No JVD.


RESPIRATORY: Rhonchi on right side.


CARDIAC: Tachycardic rate, normal rhythm. Extremities warm and well perfused. 

Pulses equal.


ABDOMEN: Soft, non-distended. No tenderness to palpation. No rebound or 

guarding. No masses.


RECTAL: Deferred.


MUSCULOSKELETAL: Mediport in left upper chest. Chest examination reveals no 

tenderness. The back is symmetrical on inspection without obvious abnormality. 

There is no CVA tenderness to palpation. No joint edema. 


LOWER EXTREMITIES: Calves are equal size bilaterally and non-tender. No edema. 

No discoloration. 


NEURO: Normal sensorium. No sensory or motor deficits noted. 


SKIN: No rash or jaundice noted.





Medical Decision & Procedures


ER Provider


Diagnostic Interpretation:


Chest X-Ray: The results were interpreted by me. Consolidation in the right 

base. No pneumothorax or effusion.





Laboratory Results


1/30/18 23:05








Red Blood Count 4.67, Mean Corpuscular Volume 87.6, Mean Corpuscular Hemoglobin 

31.0, Mean Corpuscular Hemoglobin Concent 35.5, Mean Platelet Volume 9.7, 

Neutrophils (%) (Auto) 83.1, Lymphocytes (%) (Auto) 5.8, Monocytes (%) (Auto) 

8.2, Eosinophils (%) (Auto) 1.0, Basophils (%) (Auto) 0.5, Neutrophils # (Auto) 

4.83, Lymphocytes # (Auto) 0.34, Monocytes # (Auto) 0.48, Eosinophils # (Auto) 

0.06, Basophils # (Auto) 0.03





1/30/18 23:05

















Test


  1/30/18


23:05 1/30/18


23:18 1/31/18


00:57 1/31/18


01:09


 


White Blood Count


  5.82 K/uL


(4.8-10.8) 


  


  


 


 


Red Blood Count


  4.67 M/uL


(4.7-6.1) 


  


  


 


 


Hemoglobin


  14.5 g/dL


(14.0-18.0) 


  


  


 


 


Hematocrit 40.9 % (42-52)    


 


Mean Corpuscular Volume


  87.6 fL


() 


  


  


 


 


Mean Corpuscular Hemoglobin


  31.0 pg


(25-34) 


  


  


 


 


Mean Corpuscular Hemoglobin


Concent 35.5 g/dl


(32-36) 


  


  


 


 


Platelet Count


  126 K/uL


(130-400) 


  


  


 


 


Mean Platelet Volume


  9.7 fL


(7.4-10.4) 


  


  


 


 


Neutrophils (%) (Auto) 83.1 %    


 


Lymphocytes (%) (Auto) 5.8 %    


 


Monocytes (%) (Auto) 8.2 %    


 


Eosinophils (%) (Auto) 1.0 %    


 


Basophils (%) (Auto) 0.5 %    


 


Neutrophils # (Auto)


  4.83 K/uL


(1.4-6.5) 


  


  


 


 


Lymphocytes # (Auto)


  0.34 K/uL


(1.2-3.4) 


  


  


 


 


Monocytes # (Auto)


  0.48 K/uL


(0.11-0.59) 


  


  


 


 


Eosinophils # (Auto)


  0.06 K/uL


(0-0.5) 


  


  


 


 


Basophils # (Auto)


  0.03 K/uL


(0-0.2) 


  


  


 


 


RDW Standard Deviation


  41.9 fL


(36.4-46.3) 


  


  


 


 


RDW Coefficient of Variation


  13.2 %


(11.5-14.5) 


  


  


 


 


Immature Granulocyte % (Auto) 1.4 %    


 


Immature Granulocyte # (Auto)


  0.08 K/uL


(0.00-0.02) 


  


  


 


 


Toxic Granulation 1+    


 


Toxic Vacuolation 1+    


 


Dohle Bodies 2+    


 


Prothrombin Time


  11.5 SECONDS


(9.0-12.0) 


  


  


 


 


Prothromb Time International


Ratio 1.1 (0.9-1.1) 


  


  


  


 


 


Activated Partial


Thromboplast Time 26.3 SECONDS


(21.0-31.0) 


  


  


 


 


Partial Thromboplastin Ratio 1.0    


 


Anion Gap


  10.0 mmol/L


(3-11) 


  


  


 


 


Est Creatinine Clear Calc


Drug Dose 87.0 ml/min 


  


  


  


 


 


Estimated GFR (


American) 95.6 


  


  


  


 


 


Estimated GFR (Non-


American 82.5 


  


  


  


 


 


BUN/Creatinine Ratio 25.0 (10-20)    


 


Calcium Level


  8.7 mg/dl


(8.5-10.1) 


  


  


 


 


Magnesium Level


  2.0 mg/dl


(1.8-2.4) 


  


  


 


 


Total Bilirubin


  0.8 mg/dl


(0.2-1) 


  


  


 


 


Direct Bilirubin


  0.2 mg/dl


(0-0.2) 


  


  


 


 


Aspartate Amino Transf


(AST/SGOT) 17 U/L (15-37) 


  


  


  


 


 


Alanine Aminotransferase


(ALT/SGPT) 29 U/L (12-78) 


  


  


  


 


 


Alkaline Phosphatase


  131 U/L


() 


  


  


 


 


Troponin I


  < 0.015 ng/ml


(0-0.045) 


  


  


 


 


Total Protein


  7.0 gm/dl


(6.4-8.2) 


  


  


 


 


Albumin


  3.6 gm/dl


(3.4-5.0) 


  


  


 


 


Lipase


  70 U/L


() 


  


  


 


 


Thyroid Stimulating Hormone


(TSH) 0.434 uIu/ml


(0.300-4.500) 


  


  


 


 


Influenza Type A Antigen


  


  Neg for Influ


A (NEG) 


  


 


 


Influenza Type B Antigen


  


  Neg for Influ


B (NEG) 


  


 





Laboratory results reviewed by me





Medications Administered











 Medications


  (Trade)  Dose


 Ordered  Sig/Matthias


 Route  Start Time


 Stop Time Status Last Admin


Dose Admin


 


 Sodium Chloride  1,000 ml @ 


 125 mls/hr  Q8H STAT


 IV  1/30/18 22:50


 1/31/18 06:49  1/30/18 23:16


125 MLS/HR


 


 Sodium Chloride  1,000 ml @ 


 999 mls/hr  Q1H1M STAT


 IV  1/30/18 23:26


 1/31/18 00:26 DC 1/30/18 23:41


999 MLS/HR


 


 Levofloxacin


  (Levaquin / D5W)  750 mg  NOW  STAT


 IV  1/30/18 23:26


 1/30/18 23:28 DC 1/30/18 23:40


750 MG


 


 Acetaminophen


  (Tylenol Tab)  1,000 mg  NOW  STAT


 PO  1/30/18 23:26


 1/30/18 23:28 DC 1/30/18 23:41


1,000 MG


 


 Albuterol/


 Ipratropium


  (Duoneb)  3 ml  NOW  STAT


 INH  1/30/18 23:28


 1/30/18 23:29 DC 1/30/18 23:40


3 ML


 


 Hydrocodone Bit/


 Homatropine


 Methylb


  (Hycodan Syrup)  5 ml  NOW  STAT


 PO  1/30/18 23:55


 1/30/18 23:56 DC 1/31/18 00:12


5 ML


 


 Potassium Chloride


  (Klor-Con M10)  40 meq  STK-MED ONCE


 .ROUTE  1/31/18 00:40


 1/31/18 00:41 DC 1/31/18 00:44


40 MEQ











ECG


Indication:  chest pain


Rate (beats per minute):  112


Rhythm:  sinus tachycardia


Findings:  PVC, no acute ischemic change


Change:


Patient's electrocardiogram was interpreted by me.





ED Course


2250: Ordered  ml @ 125 mls/hr IV





2323: The patient was evaluated in room C11B. A complete history and physical 

exam was performed.


.


2326: Ordered Tylenol Tab 1000 mg PO, Levofloxacin 750 mg IV, NSS 1000 ml @ 999 

mls/hr IV.





2328: Ordered Duoneb 3 ml INH.





2355: Ordered Hycodan Syrup 5 ml PO.





0016: I reevaluated the patient and he is feeling okay.





0018: Discussed the patient's case with Dr. Oconer- [facility]. The 

patient will be evaluated for further treatment and disposition.





Medical Decision


Triage Nursing notes reviewed.


The patient's presentation and history were concerning for fever and recent 

pneumonia dx.





Etiologies such as  pneumonia, COPD, reactive airway disease, CHF, cardiac 

ischemia, pulmonary embolism, pneumothorax, musculoskeletal, infections, 

gastrointestinal, as well as others were entertained.  





The patient was evaluated.  He had flulike symptoms.  His history was 

concerning for chemotherapy and recent diagnosis of pneumonia.  He was getting 

worse despite outpatient antibiotic treatment.  Chest x-ray was performed and 

shows abnormalities in the right lower lobe.  Clinically he has this on 

physical examination as well.  His CBC did not reveal any evidence of 

neutropenia.  His remainder of blood work looked good.  His flu testing was 

negative.  His ECG was unremarkable.  The patient was treated with saline 

hydration.  He was given Tylenol, Levaquin, and Hycodan.  The patient was also 

given a DuoNeb.  He is still outpatient management of his pneumonia and he is 

on chemotherapy.  Consultation was made with internal medicine.  The patient 

was evaluated in the Emergency Room for further treatment.





Medication Reconcilliation


Current Medication List:  was personally reviewed by me





Blood Pressure Screening


Patient's blood pressure:  Normal blood pressure


Blood pressure disposition:  Did not require urgent referral





Consults


Time Called:  0015


Consulting Physician:  Dr. Gonsalo Pacheco


Returned Call:  0018


Discussed the patient's case with Dr. Oconer- [facility]. The 

patient will be evaluated for further treatment and disposition.





Impression





 Primary Impression:  


 Pneumonia


 Additional Impression:  


 Failure of outpatient treatment





Scribe Attestation


The scribe's documentation has been prepared under my direction and personally 

reviewed by me in its entirety. I confirm that the note above accurately 

reflects all work, treatment, procedures, and medical decision making performed 

by me.





Departure Information


Dispostion


Being Evaluated By Hospitalist





Referrals


Abbi MIRZA M.D. (PCP)





Patient Instructions


My [facility]





Problem Qualifiers

## 2018-01-31 NOTE — DIAGNOSTIC IMAGING REPORT
CHEST ONE VIEW PORTABLE



CLINICAL HISTORY: EVALUATE WEAKNESS    



COMPARISON STUDY:  Chest CT November 14, 2017 and chest radiograph January 12, 2008



FINDINGS: A left internal jugular [id number] is in place. There is no

pneumothorax or pleural effusion. There is no evidence for pulmonary edema.

Cardiomediastinal silhouette is stable. Linear bibasilar opacities favor

atelectasis. There are old right-sided rib fractures.



IMPRESSION:  



1. No acute cardiopulmonary findings.



2. Diminished lung volumes with bibasilar opacities suggestive of atelectasis. 









Electronically signed by:  Chip Torres M.D.

1/31/2018 6:36 AM



Dictated Date/Time:  1/31/2018 6:34 AM

## 2018-01-31 NOTE — PROGRESS NOTE
Internal Med Progress Note


Date of Service:


Jan 31, 2018.


Provider Documentation:





SUBJECTIVE:





Seen and examined at bedside


Reports generalized weakness


Also has cough and muscular pain from coughing


Denies any SOB, chest pain


No other complaints





OBJECTIVE:





Vital Signs-as noted below





Physical Exam:


General Appearance:Moderately built and nourished, no apparent distress


Head:  normocephalic, Atraumatic 


Eyes:  normal inspection, EOMI, PERRL


Neck:  supple, Trachea midline


Respiratory/Chest: Normal breath sounds, CTA


Cardiovascular: S1, S2, No murmur


Abdomen/GI:Soft, Non tender, Bowel sounds present


Extremities/Musculoskelatal:normal inspection, no edema 


Neurologic/Psych:AAOX3, grossly no focal neurological deficits 


Skin:  normal color, warm





Lab data as noted below.


ASSESSMENT & PLAN:





Sepsis: Atypical Pneumonia/Bronchitis/Laryngitis 


Immunocompromised patient


Influenza negative


Failed outpatient Doxycycline


CXR:. No acute cardiopulmonary findings


Continue Levaquin


Normal Lactate levels


IV fluids


Nebs, Oxygen PRN








H/O NHL:


Ongoing chemotherapy


Follows with 


Next chemotherapy due on 2/13/18








Thrombocytopenia:


Likely secondary to Chemotherapy


HIT screen: Negative


Monitor


 





DVT px:


SCDs RE thrombocytopenia








Code Status:  


Full code








Disposition:


Expect to discharge home when stable


Vital Signs:











  Date Time  Temp Pulse Resp B/P (MAP) Pulse Ox O2 Delivery O2 Flow Rate FiO2


 


1/31/18 12:32      Room Air  


 


1/31/18 11:44 36.7 76 20 134/82 (99) 97   


 


1/31/18 08:26 36.4 76 18 118/83 (95) 95 Room Air  


 


1/31/18 01:10 36.5 104 22 123/74 95 Room Air  


 


1/31/18 01:10 36.5 104 22 123/74 (90) 95 Room Air  


 


1/31/18 00:49 36.9 105 20 113/75 95   


 


1/30/18 23:47  109 20 118/76 94 Room Air  


 


1/30/18 23:33  115      


 


1/30/18 22:35 37.8 135 18 117/76 92 Room Air  








Lab Results:





Results Past 24 Hours








Test


  1/30/18


23:05 1/30/18


23:15 1/30/18


23:18 1/31/18


00:57 Range/Units


 


 


White Blood Count 5.82    4.8-10.8  K/uL


 


Red Blood Count 4.67    4.7-6.1  M/uL


 


Hemoglobin 14.5    14.0-18.0  g/dL


 


Hematocrit 40.9    42-52  %


 


Mean Corpuscular Volume 87.6      fL


 


Mean Corpuscular Hemoglobin 31.0    25-34  pg


 


Mean Corpuscular Hemoglobin


Concent 35.5


  


  


  


  32-36  g/dl


 


 


Platelet Count 126    130-400  K/uL


 


Mean Platelet Volume 9.7    7.4-10.4  fL


 


Neutrophils (%) (Auto) 83.1     %


 


Lymphocytes (%) (Auto) 5.8     %


 


Monocytes (%) (Auto) 8.2     %


 


Eosinophils (%) (Auto) 1.0     %


 


Basophils (%) (Auto) 0.5     %


 


Neutrophils # (Auto) 4.83    1.4-6.5  K/uL


 


Lymphocytes # (Auto) 0.34    1.2-3.4  K/uL


 


Monocytes # (Auto) 0.48    0.11-0.59  K/uL


 


Eosinophils # (Auto) 0.06    0-0.5  K/uL


 


Basophils # (Auto) 0.03    0-0.2  K/uL


 


RDW Standard Deviation 41.9    36.4-46.3  fL


 


RDW Coefficient of Variation 13.2    11.5-14.5  %


 


Immature Granulocyte % (Auto) 1.4     %


 


Immature Granulocyte # (Auto) 0.08    0.00-0.02  K/uL


 


Toxic Granulation 1+     


 


Toxic Vacuolation 1+     


 


Dohle Bodies 2+     


 


Prothrombin Time


  11.5


  


  


  


  9.0-12.0


SECONDS


 


Prothromb Time International


Ratio 1.1


  


  


  


  0.9-1.1  


 


 


Activated Partial


Thromboplast Time 26.3


  


  


  


  21.0-31.0


SECONDS


 


Partial Thromboplastin Ratio 1.0     


 


Sodium Level 135    136-145  mmol/L


 


Potassium Level 3.6    3.5-5.1  mmol/L


 


Chloride Level 101      mmol/L


 


Carbon Dioxide Level 24    21-32  mmol/L


 


Anion Gap 10.0    3-11  mmol/L


 


Blood Urea Nitrogen 24    7-18  mg/dl


 


Creatinine


  0.95


  


  


  


  0.60-1.40


mg/dl


 


Est Creatinine Clear Calc


Drug Dose 87.0


  


  


  


   ml/min


 


 


Estimated GFR (


American) 95.6


  


  


  


   


 


 


Estimated GFR (Non-


American 82.5


  


  


  


   


 


 


BUN/Creatinine Ratio 25.0    10-20  


 


Random Glucose 118    70-99  mg/dl


 


Calcium Level 8.7    8.5-10.1  mg/dl


 


Magnesium Level 2.0    1.8-2.4  mg/dl


 


Total Bilirubin 0.8    0.2-1  mg/dl


 


Direct Bilirubin 0.2    0-0.2  mg/dl


 


Aspartate Amino Transf


(AST/SGOT) 17


  


  


  


  15-37  U/L


 


 


Alanine Aminotransferase


(ALT/SGPT) 29


  


  


  


  12-78  U/L


 


 


Alkaline Phosphatase 131      U/L


 


Troponin I < 0.015    0-0.045  ng/ml


 


Total Protein 7.0    6.4-8.2  gm/dl


 


Albumin 3.6    3.4-5.0  gm/dl


 


Lipase 70      U/L


 


Thyroid Stimulating Hormone


(TSH) 0.434


  


  


  


  0.300-4.500


uIu/ml


 


Influenza Type A (RT-PCR)  Neg for Influ A   NEG  


 


Influenza Type B (RT-PCR)  Neg for Influ B   NEG  


 


Influenza Type A Antigen   Neg for Influ A  NEG  


 


Influenza Type B Antigen   Neg for Influ B  NEG  


 


Lactic Acid Level    1.1 0.4-2.0  mmol/L


 


Heparin-PF4 Antibody Screen    NEG NEG  


 


Test


  1/31/18


08:37 1/31/18


08:45 


  


  Range/Units


 


 


White Blood Count 5.32    4.8-10.8  K/uL


 


Red Blood Count 4.28    4.7-6.1  M/uL


 


Hemoglobin 13.2    14.0-18.0  g/dL


 


Hematocrit 37.9    42-52  %


 


Mean Corpuscular Volume 88.6      fL


 


Mean Corpuscular Hemoglobin 30.8    25-34  pg


 


Mean Corpuscular Hemoglobin


Concent 34.8


  


  


  


  32-36  g/dl


 


 


Platelet Count 122    130-400  K/uL


 


Mean Platelet Volume 9.5    7.4-10.4  fL


 


Neutrophils (%) (Auto) 76.9     %


 


Lymphocytes (%) (Auto) 8.6     %


 


Monocytes (%) (Auto) 9.8     %


 


Eosinophils (%) (Auto) 2.8     %


 


Basophils (%) (Auto) 0.2     %


 


Neutrophils # (Auto) 4.09    1.4-6.5  K/uL


 


Lymphocytes # (Auto) 0.46    1.2-3.4  K/uL


 


Monocytes # (Auto) 0.52    0.11-0.59  K/uL


 


Eosinophils # (Auto) 0.15    0-0.5  K/uL


 


Basophils # (Auto) 0.01    0-0.2  K/uL


 


RDW Standard Deviation 42.5    36.4-46.3  fL


 


RDW Coefficient of Variation 13.2    11.5-14.5  %


 


Immature Granulocyte % (Auto) 1.7     %


 


Immature Granulocyte # (Auto) 0.09    0.00-0.02  K/uL


 


Toxic Granulation 1+     


 


Dohle Bodies 2+     


 


Sodium Level 138    136-145  mmol/L


 


Potassium Level 3.8    3.5-5.1  mmol/L


 


Chloride Level 107      mmol/L


 


Carbon Dioxide Level 23    21-32  mmol/L


 


Anion Gap 8.0    3-11  mmol/L


 


Blood Urea Nitrogen 18    7-18  mg/dl


 


Creatinine


  0.66


  


  


  


  0.60-1.40


mg/dl


 


Est Creatinine Clear Calc


Drug Dose 125.0


  


  


  


   ml/min


 


 


Estimated GFR (


American) 116.0


  


  


  


   


 


 


Estimated GFR (Non-


American 100.0


  


  


  


   


 


 


BUN/Creatinine Ratio 27.9    10-20  


 


Random Glucose 96    70-99  mg/dl


 


Calcium Level 8.5    8.5-10.1  mg/dl


 


Urine Color  YELLOW    


 


Urine Appearance  CLEAR   CLEAR  


 


Urine pH  6.0   4.5-7.5  


 


Urine Specific Gravity  1.020   1.000-1.030  


 


Urine Protein  NEG   NEG  


 


Urine Glucose (UA)  NEG   NEG  


 


Urine Ketones  NEG   NEG  


 


Urine Occult Blood  NEG   NEG  


 


Urine Nitrite  NEG   NEG  


 


Urine Bilirubin  NEG   NEG  


 


Urine Urobilinogen  NEG   NEG  


 


Urine Leukocyte Esterase  NEG   NEG  








Microbiology Results


1/30/18 Blood Culture, Received


          Pending


1/30/18 Blood Culture, Received


          Pending


1/31/18 Group A Streptococcus Screen - Final, Resulted


          SPECIMEN NEGATIVE FOR GROUP A BETA ST...


1/31/18 Group A Streptococcus Screen (MICHAEL), Resulted


          Pending

## 2018-02-01 VITALS
TEMPERATURE: 98.6 F | HEART RATE: 96 BPM | DIASTOLIC BLOOD PRESSURE: 80 MMHG | OXYGEN SATURATION: 93 % | SYSTOLIC BLOOD PRESSURE: 116 MMHG

## 2018-02-01 VITALS
DIASTOLIC BLOOD PRESSURE: 85 MMHG | SYSTOLIC BLOOD PRESSURE: 137 MMHG | OXYGEN SATURATION: 93 % | TEMPERATURE: 97.7 F | HEART RATE: 79 BPM

## 2018-02-01 VITALS
OXYGEN SATURATION: 95 % | DIASTOLIC BLOOD PRESSURE: 81 MMHG | HEART RATE: 81 BPM | TEMPERATURE: 98.06 F | SYSTOLIC BLOOD PRESSURE: 124 MMHG

## 2018-02-01 VITALS
SYSTOLIC BLOOD PRESSURE: 112 MMHG | DIASTOLIC BLOOD PRESSURE: 76 MMHG | OXYGEN SATURATION: 93 % | TEMPERATURE: 97.7 F | HEART RATE: 85 BPM

## 2018-02-01 VITALS
DIASTOLIC BLOOD PRESSURE: 71 MMHG | OXYGEN SATURATION: 96 % | SYSTOLIC BLOOD PRESSURE: 108 MMHG | TEMPERATURE: 97.7 F | HEART RATE: 85 BPM

## 2018-02-01 VITALS — OXYGEN SATURATION: 93 %

## 2018-02-01 LAB
BUN SERPL-MCNC: 13 MG/DL (ref 7–18)
CALCIUM SERPL-MCNC: 8.9 MG/DL (ref 8.5–10.1)
CO2 SERPL-SCNC: 26 MMOL/L (ref 21–32)
CREAT SERPL-MCNC: 0.86 MG/DL (ref 0.6–1.4)
EOSINOPHIL NFR BLD AUTO: 128 K/UL (ref 130–400)
GLUCOSE SERPL-MCNC: 80 MG/DL (ref 70–99)
HCT VFR BLD CALC: 38.1 % (ref 42–52)
HGB BLD-MCNC: 13.2 G/DL (ref 14–18)
MCH RBC QN AUTO: 30.8 PG (ref 25–34)
MCHC RBC AUTO-ENTMCNC: 34.6 G/DL (ref 32–36)
MCV RBC AUTO: 89 FL (ref 80–100)
PMV BLD AUTO: 8.7 FL (ref 7.4–10.4)
POTASSIUM SERPL-SCNC: 4 MMOL/L (ref 3.5–5.1)
RED CELL DISTRIBUTION WIDTH CV: 13.4 % (ref 11.5–14.5)
RED CELL DISTRIBUTION WIDTH SD: 43.3 FL (ref 36.4–46.3)
SODIUM SERPL-SCNC: 135 MMOL/L (ref 136–145)
WBC # BLD AUTO: 6.04 K/UL (ref 4.8–10.8)

## 2018-02-01 RX ADMIN — LEVOFLOXACIN SCH MG: 750 TABLET, FILM COATED ORAL at 12:12

## 2018-02-01 RX ADMIN — HEPARIN PRN ML: 100 SYRINGE at 05:26

## 2018-02-01 RX ADMIN — GUAIFENESIN SCH MG: 600 TABLET, EXTENDED RELEASE ORAL at 08:24

## 2018-02-01 RX ADMIN — GUAIFENESIN SCH MG: 600 TABLET, EXTENDED RELEASE ORAL at 19:47

## 2018-02-01 RX ADMIN — ALLOPURINOL SCH MG: 300 TABLET ORAL at 08:24

## 2018-02-01 RX ADMIN — HYDROCODONE BITARTRATE AND HOMATROPINE METHYLBROMIDE PRN ML: 5; 1.5 SOLUTION ORAL at 00:04

## 2018-02-01 RX ADMIN — Medication SCH MG: at 08:23

## 2018-02-01 NOTE — PROGRESS NOTE
Internal Med Progress Note


Date of Service:


Feb 1, 2018.


Provider Documentation:





SUBJECTIVE:





Seen and examined at bedside


Cough, Hoarseness improving


Denies any SOB, chest pain


No other complaints





OBJECTIVE:





Vital Signs-as noted below





Physical Exam:


General Appearance:Moderately built and nourished, no apparent distress


Head:  normocephalic, Atraumatic 


Eyes:  normal inspection, EOMI, PERRL


Neck:  supple, Trachea midline


Respiratory/Chest: Normal breath sounds, CTA


Cardiovascular: S1, S2, No murmur


Abdomen/GI:Soft, Non tender, Bowel sounds present


Extremities/Musculoskelatal:normal inspection, no edema 


Neurologic/Psych:AAOX3, grossly no focal neurological deficits 


Skin:  normal color, warm





Lab data as noted below.


ASSESSMENT & PLAN:





Sepsis: Atypical Pneumonia/Bronchitis/Laryngitis 


Immunocompromised patient


Influenza negative


Failed outpatient Doxycycline


CXR:. No acute cardiopulmonary findings


Continue Levaquin


Normal Lactate levels


IV fluids discontinued


Nebs, Oxygen PRN








H/O NHL:


Ongoing chemotherapy


Appreciate Oncology Input 


Next chemotherapy due on 2/13/18 








Thrombocytopenia:


Likely secondary to Chemotherapy


HIT screen: Negative


Monitor


 





DVT px:


SCDs RE thrombocytopenia








Code Status:  


Full code








Disposition:


Expect to discharge home tomorrow if stable


Vital Signs:











  Date Time  Temp Pulse Resp B/P (MAP) Pulse Ox O2 Delivery O2 Flow Rate FiO2


 


2/1/18 15:24 37.0 96 16 116/80 (92) 93 Room Air  


 


2/1/18 15:20      Room Air  


 


2/1/18 08:30     93 Room Air  


 


2/1/18 07:55      Room Air  


 


2/1/18 07:49 36.5 79 18 137/85 (102) 93 Room Air  


 


2/1/18 03:55 36.7 81 19 124/81 (95) 95 Room Air  


 


2/1/18 00:00      Room Air  


 


1/31/18 22:32 36.3 82 18 124/81 (95) 94 Nasal Cannula  


 


1/31/18 19:16 36.9 101 20 135/84 (101) 94 Room Air  








Lab Results:





Results Past 24 Hours








Test


  2/1/18


05:29 Range/Units


 


 


White Blood Count 6.04 4.8-10.8  K/uL


 


Red Blood Count 4.28 4.7-6.1  M/uL


 


Hemoglobin 13.2 14.0-18.0  g/dL


 


Hematocrit 38.1 42-52  %


 


Mean Corpuscular Volume 89.0   fL


 


Mean Corpuscular Hemoglobin 30.8 25-34  pg


 


Mean Corpuscular Hemoglobin


Concent 34.6


  32-36  g/dl


 


 


RDW Standard Deviation 43.3 36.4-46.3  fL


 


RDW Coefficient of Variation 13.4 11.5-14.5  %


 


Platelet Count 128 130-400  K/uL


 


Mean Platelet Volume 8.7 7.4-10.4  fL


 


Sodium Level 135 136-145  mmol/L


 


Potassium Level 4.0 3.5-5.1  mmol/L


 


Chloride Level 104   mmol/L


 


Carbon Dioxide Level 26 21-32  mmol/L


 


Anion Gap 5.0 3-11  mmol/L


 


Blood Urea Nitrogen 13 7-18  mg/dl


 


Creatinine


  0.86


  0.60-1.40


mg/dl


 


Est Creatinine Clear Calc


Drug Dose 96.0


   ml/min


 


 


Estimated GFR (


American) 104.0


   


 


 


Estimated GFR (Non-


American 89.7


   


 


 


BUN/Creatinine Ratio 15.6 10-20  


 


Random Glucose 80 70-99  mg/dl


 


Calcium Level 8.9 8.5-10.1  mg/dl

## 2018-02-02 VITALS
TEMPERATURE: 98.6 F | OXYGEN SATURATION: 94 % | HEART RATE: 85 BPM | DIASTOLIC BLOOD PRESSURE: 78 MMHG | SYSTOLIC BLOOD PRESSURE: 122 MMHG

## 2018-02-02 VITALS
SYSTOLIC BLOOD PRESSURE: 125 MMHG | OXYGEN SATURATION: 93 % | DIASTOLIC BLOOD PRESSURE: 78 MMHG | HEART RATE: 85 BPM | TEMPERATURE: 97.52 F

## 2018-02-02 VITALS — OXYGEN SATURATION: 94 %

## 2018-02-02 VITALS
OXYGEN SATURATION: 94 % | SYSTOLIC BLOOD PRESSURE: 122 MMHG | DIASTOLIC BLOOD PRESSURE: 78 MMHG | HEART RATE: 74 BPM | TEMPERATURE: 98.6 F

## 2018-02-02 LAB
BUN SERPL-MCNC: 22 MG/DL (ref 7–18)
CALCIUM SERPL-MCNC: 8.6 MG/DL (ref 8.5–10.1)
CO2 SERPL-SCNC: 27 MMOL/L (ref 21–32)
CREAT SERPL-MCNC: 0.91 MG/DL (ref 0.6–1.4)
EOSINOPHIL NFR BLD AUTO: 149 K/UL (ref 130–400)
GLUCOSE SERPL-MCNC: 101 MG/DL (ref 70–99)
HCT VFR BLD CALC: 38.5 % (ref 42–52)
HGB BLD-MCNC: 13.4 G/DL (ref 14–18)
MCH RBC QN AUTO: 30.9 PG (ref 25–34)
MCHC RBC AUTO-ENTMCNC: 34.8 G/DL (ref 32–36)
MCV RBC AUTO: 88.9 FL (ref 80–100)
PMV BLD AUTO: 9.3 FL (ref 7.4–10.4)
POTASSIUM SERPL-SCNC: 4.1 MMOL/L (ref 3.5–5.1)
RED CELL DISTRIBUTION WIDTH CV: 13.2 % (ref 11.5–14.5)
RED CELL DISTRIBUTION WIDTH SD: 42.4 FL (ref 36.4–46.3)
SODIUM SERPL-SCNC: 135 MMOL/L (ref 136–145)
WBC # BLD AUTO: 7.11 K/UL (ref 4.8–10.8)

## 2018-02-02 RX ADMIN — ALLOPURINOL SCH MG: 300 TABLET ORAL at 08:53

## 2018-02-02 RX ADMIN — HYDROCODONE BITARTRATE AND HOMATROPINE METHYLBROMIDE PRN ML: 5; 1.5 SOLUTION ORAL at 09:57

## 2018-02-02 RX ADMIN — LEVOFLOXACIN SCH MG: 750 TABLET, FILM COATED ORAL at 11:35

## 2018-02-02 RX ADMIN — Medication SCH MG: at 08:53

## 2018-02-02 RX ADMIN — HYDROCODONE BITARTRATE AND HOMATROPINE METHYLBROMIDE PRN ML: 5; 1.5 SOLUTION ORAL at 03:45

## 2018-02-02 RX ADMIN — GUAIFENESIN SCH MG: 600 TABLET, EXTENDED RELEASE ORAL at 08:53

## 2018-02-02 RX ADMIN — HEPARIN PRN ML: 100 SYRINGE at 05:07

## 2018-02-02 NOTE — DISCHARGE INSTRUCTIONS
Discharge Instructions


Date of Service


Feb 2, 2018.





Admission


Reason for Admission:  Sepsis





Discharge


Discharge Diagnosis / Problem:  Atypical Pneumonia/Bronchitis





Discharge Goals


Goal(s):  Decrease discomfort, Improve function





Activity Recommendations


Activity Limitations:  resume your previous activity


Exercise/Sports Limitations:  as tolerated





.





Instructions / Follow-Up


Instructions / Follow-Up


Follow up with your Primary Care Physician  on 2/5/18 at 9:05AM


Follow up with your Oncologist  for chemotherapy on 2/13/18





Complete the antibiotic course as prescribed


Seek immediate medical attention if your symptoms reoccur or worsen





Current Hospital Diet


Patient's current hospital diet: Regular Diet





Discharge Diet


Recommended Diet:  Regular Diet





Pending Studies


Studies pending at discharge:  no





Medical Emergencies








.


Who to Call and When:





Medical Emergencies:  If at any time you feel your situation is an emergency, 

please call 911 immediately.





.





Non-Emergent Contact


Non-Emergency issues call your:  Primary Care Provider


Call Non-Emergent contact if:  you have a fever, your pain is not controlled, 

your pain is worsening, your pain is unusual for you, your pain is concerning 

you, you have any medication questions





.


.








"Provider Documentation" section prepared by Horacio Santiago.








.





VTE Core Measure


Inpt VTE Proph given/why not?:  SCD's

## 2018-02-02 NOTE — DISCHARGE SUMMARY
Discharge Summary


Date of Service


Feb 2, 2018.





Discharge Summary


Admission Date:


Jan 31, 2018 at 00:34


Discharge Date:  Feb 2, 2018


Discharge Disposition:  Home


Principal Diagnosis:


Atypical Pneumonia/Bronchitis


Procedures:


CXR:


1. No acute cardiopulmonary findings.


2. Diminished lung volumes with bibasilar opacities suggestive of atelectasis.


Consultations:


Oncology


Pending Studies/Follow-Up:


Follow up with your Primary Care Physician  on 2/5/18 at 9:05AM


Follow up with your Oncologist  for chemotherapy on 2/13/18





Complete the antibiotic course as prescribed


Seek immediate medical attention if your symptoms reoccur or worsen





Medication Reconciliation


New Medications:  


Guaifenesin Ext Rel (Mucinex Ext Rel) 600 Mg Tabcr


600 MG PO Q12 for 4 Days, #8 EA





Levofloxacin (Levofloxacin) 750 Mg Tab


750 MG PO DAILY@11 for 6 Days, #6 TAB





 


Continued Medications:  


Acetaminophen (Tylenol) 325 Mg Tab


650 MG PO QID PRN for Pain or Fever, TAB





Allopurinol (Zyloprim) 300 Mg Tab


300 MG PO DAILY


start 3 days prior to chemo


Loratadine (Claritin) 10 Mg Tab


10 MG PO DAILY





 


Discontinued Medications:  


Doxycycline Hyclate (Doxycycline Hyclate) 100 Mg Tab


1 TAB PO BID for 7 Days











Admission Information


HPI (per Admitting provider):


CHIEF COMPLAINT:  Fever, chills, weakness.


 


HISTORY OF PRESENT ILLNESS: 





History obtained from patient and records. 





Medical history significant for mood disorder (currently not on meds), recent


diagnosis of non-Hodgkin's lymphoma ongoing chemotherapy.  





Recent confinement November 2017 for fall, multiple rib fractures.  


Patient had an incidental finding of mediastinal lymphadenopathy on imaging.


Pathology showed non-Hodgkin's lymphoma.





A-port placed 2 weeks ago.


Chemotherapy subsequently, initiated.  





Last few days noted sinus congestion, cough, runny nose, urinary frequency, 

voice change. 


Denies aspiration.


Fever, chills noted.


Outpatient UA showed mixed mandy.  A chest x-ray showed old right rib


fracture, perihilar basilar opacity, atelectasis versus infiltrate.  


Patient started on doxycycline.


Symptoms unimproved.





Patient received Levaquin at the ER for pneumonia.


Physical Exam (per Admitting):


PHYSICAL EXAMINATION:


VITAL SIGNS:  Blood pressure 117/76, pulse rate 135, later 105, RR 18,


temperature 37.8, sats 90 on room air.


GENERAL:  Noted to be pleasant, no respiratory distress, dysphonic.


SKIN:  Warm,  normal color.


HEENT:  Pink palpebral conjuctivae.  No ptosis.  Dry mucosa.


NECK:  Supple, nontender.


CHEST:  Decreased effort noted.  No tenderness


HEART:  Tachycardic.  No murmur.


ABDOMEN:  Soft, nontender.


EXTREMITIES:  No edema.  No tenderness.  No gross deformity.


NEUROLOGIC:  Coherent.  No gross focality.





Hospital Course





Sepsis: Atypical Pneumonia/Bronchitis/Laryngitis 


Immunocompromised patient


Influenza negative


Failed outpatient Doxycycline


CXR:. No acute cardiopulmonary findings


Continue Levaquin


Normal Lactate levels


Nebs, Oxygen PRN


No wheezes on exam








H/O NHL:


Ongoing chemotherapy


Appreciate Oncology Input 


Next chemotherapy due on 2/13/18 








Thrombocytopenia:


Likely secondary to Chemotherapy


HIT screen: Negative


Monitor


Thrombocytes normalized 








DVT px:


SCDs RE thrombocytopenia








Code Status:  


Full code








Disposition:


Plan to discharge home today





Follow up with your Primary Care Physician  on 2/5/18 at 9:05AM


Follow up with your Oncologist  for chemotherapy on 2/13/18





Complete the antibiotic course as prescribed


Seek immediate medical attention if your symptoms reoccur or worsen


Total time spent on discharge = 33 minutes


This includes examination of the patient, discharge planning, medication 

reconciliation, and communication with other providers.





Discharge Instructions


Discharge Instructions


Date of Service


Feb 2, 2018.





Admission


Reason for Admission:  Sepsis





Discharge


Discharge Diagnosis / Problem:  Atypical Pneumonia/Bronchitis





Discharge Goals


Goal(s):  Decrease discomfort, Improve function





Activity Recommendations


Activity Limitations:  resume your previous activity


Exercise/Sports Limitations:  as tolerated





.





Instructions / Follow-Up


Instructions / Follow-Up


Follow up with your Primary Care Physician  on 2/5/18 at 9:05AM


Follow up with your Oncologist  for chemotherapy on 2/13/18





Complete the antibiotic course as prescribed


Seek immediate medical attention if your symptoms reoccur or worsen





Current Hospital Diet


Patient's current hospital diet: Regular Diet





Discharge Diet


Recommended Diet:  Regular Diet





Pending Studies


Studies pending at discharge:  no





Medical Emergencies








.


Who to Call and When:





Medical Emergencies:  If at any time you feel your situation is an emergency, 

please call 911 immediately.





.





Non-Emergent Contact


Non-Emergency issues call your:  Primary Care Provider


Call Non-Emergent contact if:  you have a fever, your pain is not controlled, 

your pain is worsening, your pain is unusual for you, your pain is concerning 

you, you have any medication questions





.


.








"Provider Documentation" section prepared by Horacio Santiago.








.





VTE Core Measure


Inpt VTE Proph given/why not?:  SCD's

## 2018-02-02 NOTE — PROGRESS NOTE
Internal Med Progress Note


Date of Service:


Feb 2, 2018.


Provider Documentation:





SUBJECTIVE:





Seen and examined at bedside


Less Cough


Hoarseness much improved


Denies any SOB, chest pain


No other complaints





OBJECTIVE:





Vital Signs-as noted below





Physical Exam:


General Appearance:Moderately built and nourished, no apparent distress


Head:  normocephalic, Atraumatic 


Eyes:  normal inspection, EOMI, PERRL


Neck:  supple, Trachea midline


Respiratory/Chest: Normal breath sounds, CTA


Cardiovascular: S1, S2, No murmur


Abdomen/GI:Soft, Non tender, Bowel sounds present


Extremities/Musculoskelatal:normal inspection, no edema 


Neurologic/Psych:AAOX3, grossly no focal neurological deficits 


Skin:  normal color, warm





Lab data as noted below.


ASSESSMENT & PLAN:





Sepsis: Atypical Pneumonia/Bronchitis/Laryngitis 


Immunocompromised patient


Influenza negative


Failed outpatient Doxycycline


CXR:. No acute cardiopulmonary findings


Continue Levaquin


Normal Lactate levels


Nebs, Oxygen PRN


No wheezes on exam








H/O NHL:


Ongoing chemotherapy


Appreciate Oncology Input 


Next chemotherapy due on 2/13/18 








Thrombocytopenia:


Likely secondary to Chemotherapy


HIT screen: Negative


Monitor


Thrombocytes normalized 








DVT px:


SCDs RE thrombocytopenia








Code Status:  


Full code








Disposition:


Plan to discharge home today





Follow up with your Primary Care Physician  on 2/5/18 at 9:05AM


Follow up with your Oncologist  for chemotherapy on 2/13/18





Complete the antibiotic course as prescribed


Seek immediate medical attention if your symptoms reoccur or worsen


Vital Signs:











  Date Time  Temp Pulse Resp B/P (MAP) Pulse Ox O2 Delivery O2 Flow Rate FiO2


 


2/2/18 09:21     94 Room Air  


 


2/2/18 08:09 37.0 74 18 122/78 (93) 94 Room Air  





        


 


2/2/18 08:00     94 Room Air  


 


2/2/18 03:59 36.4 85 18 125/78 (94) 93 Room Air  


 


2/2/18 00:00      Room Air  


 


2/1/18 22:55 36.5 85 17 108/71 (83) 96   


 


2/1/18 19:59 36.5 85 18 112/76 (88) 93 Room Air  


 


2/1/18 19:00     93 Room Air  


 


2/1/18 15:24 37.0 96 16 116/80 (92) 93 Room Air  


 


2/1/18 15:20      Room Air  








Lab Results:





Results Past 24 Hours








Test


  2/2/18


05:03 Range/Units


 


 


White Blood Count 7.11 4.8-10.8  K/uL


 


Red Blood Count 4.33 4.7-6.1  M/uL


 


Hemoglobin 13.4 14.0-18.0  g/dL


 


Hematocrit 38.5 42-52  %


 


Mean Corpuscular Volume 88.9   fL


 


Mean Corpuscular Hemoglobin 30.9 25-34  pg


 


Mean Corpuscular Hemoglobin


Concent 34.8


  32-36  g/dl


 


 


RDW Standard Deviation 42.4 36.4-46.3  fL


 


RDW Coefficient of Variation 13.2 11.5-14.5  %


 


Platelet Count 149 130-400  K/uL


 


Mean Platelet Volume 9.3 7.4-10.4  fL


 


Sodium Level 135 136-145  mmol/L


 


Potassium Level 4.1 3.5-5.1  mmol/L


 


Chloride Level 103   mmol/L


 


Carbon Dioxide Level 27 21-32  mmol/L


 


Anion Gap 5.0 3-11  mmol/L


 


Blood Urea Nitrogen 22 7-18  mg/dl


 


Creatinine


  0.91


  0.60-1.40


mg/dl


 


Est Creatinine Clear Calc


Drug Dose 90.4


   ml/min


 


 


Estimated GFR (


American) 100.7


   


 


 


Estimated GFR (Non-


American 86.9


   


 


 


BUN/Creatinine Ratio 24.2 10-20  


 


Random Glucose 101 70-99  mg/dl


 


Calcium Level 8.6 8.5-10.1  mg/dl

## 2018-04-09 ENCOUNTER — HOSPITAL ENCOUNTER (OUTPATIENT)
Dept: HOSPITAL 45 - C.PET | Age: 68
Discharge: HOME | End: 2018-04-09
Attending: PHYSICIAN ASSISTANT
Payer: COMMERCIAL

## 2018-04-09 DIAGNOSIS — C82.18: Primary | ICD-10-CM

## 2018-04-09 NOTE — DIAGNOSTIC IMAGING REPORT
PET/CT



HISTORY:      LYMPHOMA



TECHNIQUE: PET/CT was performed from the base of the skull through the pelvis

following the intravenous administration of 12 mCi of F18-FDG. Non-contrast CT

imaging was performed over the same range without breath-hold for attenuation

correction of PET images and anatomic correlation, but not for primary

interpretation as it is not of standard diagnostic quality.



CT DOSE:    

 

COMPARISON: Chest CT 11/14/2017. CT neck 12/31/2017.



FINDINGS:

 

HEAD AND NECK:  Interval decrease in size in the cervical lymph nodes compared

to the prior studies. For comparative purposes the dominant right posterior

cervical chain lymph node currently measures 9 mm, previously measuring 2.1 cm.

The cervical lymph nodes demonstrate minimal FDG uptake with SUV max less than

2. Salivary gland uptake is likely physiologic.

 

CHEST:  Interval decrease in size in the mediastinal and hilar lymphadenopathy.

For comparative purposes the dominant right peritracheal lymph node currently

measures 1.6 x 0.7 cm. This previously measured 2.6 x 1.7 cm. These mediastinal

and hilar lymph nodes demonstrate mild FDG uptake with SUV max of 3. Focal right

lower lobe consolidation persists. This demonstrates minimal FDG uptake with an

SUV max of 1.7.

2. 2.4.

 

ABDOMEN/PELVIS:  Below the diaphragm, tracer is distributed physiologically in

the gastrointestinal and genitourinary tracts. Significant decrease in size in

the mesenteric lymph nodes. Dominant mesenteric lymph node measures 1.4 x 1.1

cm, previous measuring 3.1 x 1.4 cm. These do not demonstrate significant FDG

uptake at this time. A few mildly enlarged retroperitoneal lymph nodes do not

demonstrate significant FDG uptake. Fat-containing small left inguinal hernia.

 

MUSCULOSKELETAL:  There is no FDG-avid or destructive bone lesion.

 

IMPRESSION:  

1. Interval decrease in size in the lymph nodes within the neck, chest, and

abdomen as described above. These demonstrate minimal to mild FDG uptake

consistent with minimal residual disease in the setting of the patient's

lymphoma.

2. Stable consolidation within the right lower lobe demonstrating minimal FDG

uptake.







Electronically signed by:  Omar Baptiste M.D.

4/9/2018 12:22 PM



Dictated Date/Time:  4/9/2018 11:58 AM

## 2018-07-23 ENCOUNTER — HOSPITAL ENCOUNTER (OUTPATIENT)
Dept: HOSPITAL 45 - C.PET | Age: 68
Discharge: HOME | End: 2018-07-23
Attending: PHYSICIAN ASSISTANT
Payer: COMMERCIAL

## 2018-07-23 DIAGNOSIS — C82.18: Primary | ICD-10-CM

## 2018-07-23 NOTE — DIAGNOSTIC IMAGING REPORT
PET/CT SKULL-THIGH



CLINICAL HISTORY: LYMPHOMA    



COMPARISON STUDY:  4/9/2018



FINDINGS: The patient was injected with 13.8 mCi of F 18 labeled FDG. Following

the standard induction phase, PET/CT scanning is performed from the skull base

to the upper thigh region.



Within the neck, there is prominent salivary gland activity, similar to the

preceding study. There are no pathologically enlarged cervical lymph nodes by

size criteria. There is no definitive pathologic uptake within the cervical

nodes.



Within the chest, there is no pathologic raysa enlargement. The previously

described right paratracheal lymph node continues to measure 7 mm in short axis

which is not pathologically enlarged. No uptake is no greater than background

activity. There is continued evidence for right lower lobe

atelectasis/consolidation.



Within the abdomen, the spleen is at the upper limits of normal in size

measuring 12 cm. There is no pathologic raysa activity. There is physiologic

urinary tract and bowel activity.



Incidental note is made of hepatic cysts.



There are postsurgical changes are prior hernia repair. There is a

fat-containing left inguinal hernia.



There is no pathologic skeletal activity.



IMPRESSION:  

1. No evidence of pathologic raysa activity

2. No evidence of pathologic adenopathy by size criteria

3. Stable right lower lobe pulmonary atelectasis/consolidation 









Electronically signed by:  Harsha Blandon M.D.

7/23/2018 10:09 AM



Dictated Date/Time:  7/23/2018 9:59 AM

## 2018-11-28 NOTE — DISCHARGE INSTRUCTIONS
Discharge Instructions


Date of Service


Jan 12, 2018.





Visit


Reason for Visit:  Lymphoma Of Lymph Nodes





Discharge


Discharge Diagnosis / Problem:  S/P port-A catheter insertion





Discharge Goals


Goal(s):  Decrease discomfort, Improve function





Activity Recommendations


Activity Limitations:  per Instructions/Follow-up section


Lifting Limitations:  none


Exercise/Sports Limitations:  rest today


May Resume Sexual Activity:  when tolerated


Shower/Bathe:  may shower/bathe in 3 days


Driving or Machine Use:  resume 3 days after discharge





Anesthesia


.





Post Anesthesia Instructions:





If you have had General Anesthesia or IV Sedation:





*  Do not drive today.


*  Resume driving when surgeon permits.


*  Do not make important decisions or sign legal documents today.


*  Call surgeon for:





   1.  Temperature elevations greater than 101 degrees F.


   2.  Uncontrollable pain.


   3.  Excessive bleeding.


   4.  Persistent nausea and vomiting.


   5.  Medication intolerance (nausea, vomiting or rash).





*  For nausea and vomiting use only clear liquids such as: tea, soda, bouillon 

until nausea subsides, then gradually increase diet as tolerated.





*  If you have any concerns or questions, call your surgeon's office.  If 

physician is unavailable and it is an emergency, call 911 or go to the nearest 

emergency room.





.





Instructions / Follow-Up


Instructions / Follow-Up


Keep the dressing n for 4 days, he can take a shower on 1/16/2018, Follow up 

Dr. Shaikh 1 week, [phone]





Diet Recommendations


Recommended Home Diet:  resume previous diet





Procedures


Procedures Performed:  


A-port Insertion, Left Intrrnal jugular Vein





Pending Studies


Studies pending at discharge:  no





Medical Emergencies








.


Who to Call and When:





Medical Emergencies:  If at any time you feel your situation is an emergency, 

please call 911 immediately.





.





Non-Emergent Contact


Non-Emergency issues call your:  Surgeon





.


.








"Provider Documentation" section prepared by Cheryl Shaikh.








.





[place] Drug Monitoring Program


Search Results:  no issues identified
DC instructions